# Patient Record
Sex: MALE | Race: WHITE | NOT HISPANIC OR LATINO | Employment: OTHER | URBAN - METROPOLITAN AREA
[De-identification: names, ages, dates, MRNs, and addresses within clinical notes are randomized per-mention and may not be internally consistent; named-entity substitution may affect disease eponyms.]

---

## 2019-05-15 ENCOUNTER — NURSE TRIAGE (OUTPATIENT)
Dept: ADMINISTRATIVE | Facility: CLINIC | Age: 34
End: 2019-05-15

## 2019-05-15 NOTE — TELEPHONE ENCOUNTER
Reason for Disposition   Symptoms of anxiety or panic and has not been evaluated for this by physician    Protocols used: ANXIETY AND PANIC ATTACK-A-[place]    Patient states that he is currently at work denies an plan to harm himself or others. States that he has feelings of anxiety, panic attacks and depression occasionally that he needs to see someone for. No PCP or psychiatrist on file. Would like to get establishment with a PCP. Call transferred to scheduling. Patient advised to go to [facility] if he felt that he needs to be seen today.

## 2019-05-17 ENCOUNTER — OFFICE VISIT (OUTPATIENT)
Dept: FAMILY MEDICINE | Facility: CLINIC | Age: 34
End: 2019-05-17

## 2019-05-17 VITALS
DIASTOLIC BLOOD PRESSURE: 78 MMHG | OXYGEN SATURATION: 96 % | BODY MASS INDEX: 27.78 KG/M2 | HEART RATE: 65 BPM | WEIGHT: 198.44 LBS | HEIGHT: 71 IN | TEMPERATURE: 98 F | SYSTOLIC BLOOD PRESSURE: 122 MMHG | RESPIRATION RATE: 16 BRPM

## 2019-05-17 DIAGNOSIS — F41.9 ANXIETY AND DEPRESSION: Primary | ICD-10-CM

## 2019-05-17 DIAGNOSIS — F32.A ANXIETY AND DEPRESSION: Primary | ICD-10-CM

## 2019-05-17 PROCEDURE — 99999 PR PBB SHADOW E&M-EST. PATIENT-LVL III: CPT | Mod: PBBFAC,,, | Performed by: PHYSICIAN ASSISTANT

## 2019-05-17 PROCEDURE — 99999 PR PBB SHADOW E&M-EST. PATIENT-LVL III: ICD-10-PCS | Mod: PBBFAC,,, | Performed by: PHYSICIAN ASSISTANT

## 2019-05-17 PROCEDURE — 99203 PR OFFICE/OUTPT VISIT, NEW, LEVL III, 30-44 MIN: ICD-10-PCS | Mod: S$PBB,,, | Performed by: PHYSICIAN ASSISTANT

## 2019-05-17 PROCEDURE — 99203 OFFICE O/P NEW LOW 30 MIN: CPT | Mod: S$PBB,,, | Performed by: PHYSICIAN ASSISTANT

## 2019-05-17 PROCEDURE — 99213 OFFICE O/P EST LOW 20 MIN: CPT | Mod: PBBFAC,PO | Performed by: PHYSICIAN ASSISTANT

## 2019-05-17 RX ORDER — CITALOPRAM 10 MG/1
10 TABLET ORAL DAILY
Qty: 30 TABLET | Refills: 2 | Status: SHIPPED | OUTPATIENT
Start: 2019-05-17 | End: 2019-06-14 | Stop reason: SDUPTHER

## 2019-05-17 RX ORDER — BUSPIRONE HYDROCHLORIDE 5 MG/1
5 TABLET ORAL 2 TIMES DAILY
Qty: 60 TABLET | Refills: 2 | Status: SHIPPED | OUTPATIENT
Start: 2019-05-17 | End: 2019-06-14 | Stop reason: SDUPTHER

## 2019-05-17 NOTE — PROGRESS NOTES
Subjective:       Patient ID: Sajan Lane is a 33 y.o. male.    Chief Complaint: Annual Exam (EST care ); Anxiety; Depression; and Medicare IPPE    Mr. Lane comes to clinic today to establish care. He was previously a patient of Dr. Vera but he has not been in office in over 5 years. The patient's primary concern at this time is depression and anxiety. The patient is currently going through a separation from his wife. He has custody of his four year old daughter. The patient reports that he has been increasingly tearful. He is having difficulty sleeping and has a decreased appetite. He is currently in counseling but is only attending once per month. He would like to increase the frequency if his schedule permits.     Review of Systems   Constitutional: Negative for activity change, appetite change and fever.   HENT: Negative for postnasal drip, rhinorrhea and sinus pressure.    Eyes: Negative for visual disturbance.   Respiratory: Negative for cough and shortness of breath.    Cardiovascular: Negative for chest pain.   Gastrointestinal: Negative for abdominal distention and abdominal pain.   Genitourinary: Negative for difficulty urinating and dysuria.   Musculoskeletal: Negative for arthralgias and myalgias.   Neurological: Negative for headaches.   Hematological: Negative for adenopathy.   Psychiatric/Behavioral: Positive for dysphoric mood and sleep disturbance. The patient is nervous/anxious.        Objective:      Physical Exam   Constitutional: He is oriented to person, place, and time.   HENT:   Mouth/Throat: Oropharynx is clear and moist. No oropharyngeal exudate.   Eyes: Pupils are equal, round, and reactive to light. Conjunctivae are normal.   Cardiovascular: Normal rate and regular rhythm.   Pulmonary/Chest: Effort normal and breath sounds normal. He has no wheezes.   Abdominal: Soft. Bowel sounds are normal. There is no tenderness.   Musculoskeletal: He exhibits no edema.   Lymphadenopathy:      He has no cervical adenopathy.   Neurological: He is alert and oriented to person, place, and time.   Skin: No erythema.   Psychiatric: His behavior is normal.   Patient tearful when discussing his current situation.       Assessment:       1. Anxiety and depression        Plan:   Sajan was seen today for annual exam, anxiety, depression and medicare ippe.    Diagnoses and all orders for this visit:    Anxiety and depression  -     TSH; Future  -     Comprehensive metabolic panel; Future  -     CBC auto differential; Future  -     Lipid panel; Future  -     busPIRone (BUSPAR) 5 MG Tab; Take 1 tablet (5 mg total) by mouth 2 (two) times daily.  -     citalopram (CELEXA) 10 MG tablet; Take 1 tablet (10 mg total) by mouth once daily.  -     TSH  -     Comprehensive metabolic panel  -     CBC auto differential  -     Lipid panel    Patient to increase his frequency of counseling.   Follow up in 4 weeks or sooner if needed  Patient to follow up via [facility] in 2 weeks to update me on how he is doing on medication.

## 2019-05-24 ENCOUNTER — PATIENT MESSAGE (OUTPATIENT)
Dept: FAMILY MEDICINE | Facility: CLINIC | Age: 34
End: 2019-05-24

## 2019-05-24 NOTE — TELEPHONE ENCOUNTER
Spoke to patient. He states that he has been doing well on the citalopram and buspar since his visit here on 5/17/19. States he has not missed any medication and has not changed the times that he takes the medications. Reports that he woke up this morning very anxious and more depressed than he has been since he started medication. Denies suicidal ideation. Does not feel he needs to be seen today.  Acknowledges the Memorial Day weekend activities may have his current family situation weighing more heavily on his mind.  Declines need to have medication increased at this time.  Patient states that he will e-mail again after the weekend with an update.

## 2019-06-14 ENCOUNTER — OFFICE VISIT (OUTPATIENT)
Dept: FAMILY MEDICINE | Facility: CLINIC | Age: 34
End: 2019-06-14

## 2019-06-14 VITALS
DIASTOLIC BLOOD PRESSURE: 82 MMHG | TEMPERATURE: 98 F | BODY MASS INDEX: 27.06 KG/M2 | WEIGHT: 193.31 LBS | SYSTOLIC BLOOD PRESSURE: 132 MMHG | HEART RATE: 60 BPM | HEIGHT: 71 IN | OXYGEN SATURATION: 98 % | RESPIRATION RATE: 16 BRPM

## 2019-06-14 DIAGNOSIS — F32.A ANXIETY AND DEPRESSION: ICD-10-CM

## 2019-06-14 DIAGNOSIS — F41.9 ANXIETY AND DEPRESSION: ICD-10-CM

## 2019-06-14 PROCEDURE — 99213 OFFICE O/P EST LOW 20 MIN: CPT | Mod: S$PBB,,, | Performed by: PHYSICIAN ASSISTANT

## 2019-06-14 PROCEDURE — 99999 PR PBB SHADOW E&M-EST. PATIENT-LVL IV: CPT | Mod: PBBFAC,,, | Performed by: PHYSICIAN ASSISTANT

## 2019-06-14 PROCEDURE — 99214 OFFICE O/P EST MOD 30 MIN: CPT | Mod: PBBFAC,PO | Performed by: PHYSICIAN ASSISTANT

## 2019-06-14 PROCEDURE — 99213 PR OFFICE/OUTPT VISIT, EST, LEVL III, 20-29 MIN: ICD-10-PCS | Mod: S$PBB,,, | Performed by: PHYSICIAN ASSISTANT

## 2019-06-14 PROCEDURE — 99999 PR PBB SHADOW E&M-EST. PATIENT-LVL IV: ICD-10-PCS | Mod: PBBFAC,,, | Performed by: PHYSICIAN ASSISTANT

## 2019-06-14 RX ORDER — CITALOPRAM 10 MG/1
10 TABLET ORAL DAILY
Qty: 90 TABLET | Refills: 1 | Status: SHIPPED | OUTPATIENT
Start: 2019-06-14 | End: 2019-07-11

## 2019-06-14 RX ORDER — ACETAMINOPHEN 500 MG
TABLET ORAL
COMMUNITY

## 2019-06-14 RX ORDER — BUSPIRONE HYDROCHLORIDE 5 MG/1
5 TABLET ORAL 2 TIMES DAILY
Qty: 180 TABLET | Refills: 1 | Status: SHIPPED | OUTPATIENT
Start: 2019-06-14 | End: 2019-12-31 | Stop reason: SDUPTHER

## 2019-06-14 RX ORDER — NIACIN 500 MG
500 CAPSULE, EXTENDED RELEASE ORAL NIGHTLY
COMMUNITY

## 2019-06-14 RX ORDER — ACETAMINOPHEN AND PHENYLEPHRINE HCL 325; 5 MG/1; MG/1
TABLET ORAL
COMMUNITY

## 2019-06-14 NOTE — PROGRESS NOTES
Subjective:       Patient ID: Sajan Lane is a 33 y.o. male.    Chief Complaint: Follow-up (4 wk )    Mr. Lane comes to clinic today for follow up of depression and anxiety. He has been taking celexa 10mg and buspar 5mg BID. The patient is tolerating this well and reports his symptoms are improved. He is dealing with stress much better and has been less tearful. The patient denies side effects at this time. The patient does continue to attend counseling though his counselor has been out of town recently so he has only seen her once since our last visit.     Review of Systems   Constitutional: Negative for activity change and unexpected weight change.   HENT: Negative for hearing loss, rhinorrhea and trouble swallowing.    Eyes: Negative for discharge and visual disturbance.   Respiratory: Negative for chest tightness and wheezing.    Cardiovascular: Negative for chest pain and palpitations.   Gastrointestinal: Negative for blood in stool, constipation, diarrhea and vomiting.   Endocrine: Negative for polyuria.   Genitourinary: Negative for difficulty urinating, hematuria and urgency.   Musculoskeletal: Negative for arthralgias, joint swelling and neck pain.   Neurological: Negative for weakness and headaches.   Psychiatric/Behavioral: Negative for confusion and dysphoric mood.       Objective:      Physical Exam   Constitutional: He is oriented to person, place, and time.   Cardiovascular: Normal rate and regular rhythm.   Pulmonary/Chest: Effort normal and breath sounds normal. He has no wheezes.   Abdominal: Soft. Bowel sounds are normal. There is no tenderness.   Musculoskeletal: He exhibits no edema.   Neurological: He is alert and oriented to person, place, and time.   Skin: No erythema.   Psychiatric: His behavior is normal.   Patient appears to be doing better.        Assessment:       1. Anxiety and depression        Plan:       Sajan was seen today for follow-up.    Diagnoses and all orders for  this visit:    Anxiety and depression  -     citalopram (CELEXA) 10 MG tablet; Take 1 tablet (10 mg total) by mouth once daily.  -     busPIRone (BUSPAR) 5 MG Tab; Take 1 tablet (5 mg total) by mouth 2 (two) times daily.    Improving. Continue current medication  Follow up with me in 10 weeks.

## 2019-07-08 ENCOUNTER — PATIENT MESSAGE (OUTPATIENT)
Dept: FAMILY MEDICINE | Facility: CLINIC | Age: 34
End: 2019-07-08

## 2019-07-08 NOTE — TELEPHONE ENCOUNTER
I am sorry to hear you are having more low days than good days. I do think there is room to increase the dose of medication you are on to see if we see better results with that. Is patient agreeable? I also think patient likely needs counseling on a regular basis with close follow up. Last time we spoke, he was having a hard time coordinating with his counselor. Has he been able to set up follow up with his counselor?

## 2019-07-10 ENCOUNTER — PATIENT MESSAGE (OUTPATIENT)
Dept: FAMILY MEDICINE | Facility: CLINIC | Age: 34
End: 2019-07-10

## 2019-07-10 DIAGNOSIS — F43.23 ADJUSTMENT REACTION WITH ANXIETY AND DEPRESSION: Primary | ICD-10-CM

## 2019-07-11 RX ORDER — CITALOPRAM 20 MG/1
20 TABLET, FILM COATED ORAL DAILY
Qty: 30 TABLET | Refills: 2 | Status: SHIPPED | OUTPATIENT
Start: 2019-07-11 | End: 2019-08-23 | Stop reason: SDUPTHER

## 2019-07-11 NOTE — TELEPHONE ENCOUNTER
Increased dose of citalopram sent to pharmacy. This will now be 20mg daily. Please ask patient to follow up with us via [facility] in 2 weeks to let us know how he is feeling with the increased dose in medication.

## 2019-07-25 ENCOUNTER — PATIENT MESSAGE (OUTPATIENT)
Dept: FAMILY MEDICINE | Facility: CLINIC | Age: 34
End: 2019-07-25

## 2019-07-25 NOTE — TELEPHONE ENCOUNTER
This might be beneficial for the patient. If patient is interested in adderall, we can refer patient to our clinical psychiatrist or center for ADD here in [place]. Please advise on where patient would like me to send the referral. Thanks!

## 2019-07-29 ENCOUNTER — TELEPHONE (OUTPATIENT)
Dept: FAMILY MEDICINE | Facility: CLINIC | Age: 34
End: 2019-07-29

## 2019-07-29 ENCOUNTER — PATIENT MESSAGE (OUTPATIENT)
Dept: FAMILY MEDICINE | Facility: CLINIC | Age: 34
End: 2019-07-29

## 2019-07-29 DIAGNOSIS — F43.23 ADJUSTMENT DISORDER WITH MIXED ANXIETY AND DEPRESSED MOOD: ICD-10-CM

## 2019-07-29 DIAGNOSIS — R41.840 ATTENTION AND CONCENTRATION DEFICIT: Primary | ICD-10-CM

## 2019-07-29 NOTE — TELEPHONE ENCOUNTER
----- Message from Arlen Dean sent at 7/29/2019  9:54 AM CDT -----  Contact: Sajan jerome  Type: Needs Medical Advice    Who Called:  Sajan Pool Call Back Number: [phone]   Additional Information: Pls call pt regarding his med increase. Tried skvicentae/basim n/a

## 2019-08-23 ENCOUNTER — OFFICE VISIT (OUTPATIENT)
Dept: FAMILY MEDICINE | Facility: CLINIC | Age: 34
End: 2019-08-23

## 2019-08-23 VITALS
SYSTOLIC BLOOD PRESSURE: 132 MMHG | DIASTOLIC BLOOD PRESSURE: 82 MMHG | HEART RATE: 66 BPM | TEMPERATURE: 98 F | WEIGHT: 199.75 LBS | OXYGEN SATURATION: 97 % | HEIGHT: 71 IN | BODY MASS INDEX: 27.97 KG/M2 | RESPIRATION RATE: 19 BRPM

## 2019-08-23 DIAGNOSIS — F43.23 ADJUSTMENT REACTION WITH ANXIETY AND DEPRESSION: ICD-10-CM

## 2019-08-23 DIAGNOSIS — R41.840 CONCENTRATION DEFICIT: Primary | ICD-10-CM

## 2019-08-23 DIAGNOSIS — F41.9 ANXIETY AND DEPRESSION: ICD-10-CM

## 2019-08-23 DIAGNOSIS — F32.A ANXIETY AND DEPRESSION: ICD-10-CM

## 2019-08-23 PROCEDURE — 99999 PR PBB SHADOW E&M-EST. PATIENT-LVL IV: CPT | Mod: PBBFAC,,, | Performed by: PHYSICIAN ASSISTANT

## 2019-08-23 PROCEDURE — 99999 PR PBB SHADOW E&M-EST. PATIENT-LVL IV: ICD-10-PCS | Mod: PBBFAC,,, | Performed by: PHYSICIAN ASSISTANT

## 2019-08-23 PROCEDURE — 99214 OFFICE O/P EST MOD 30 MIN: CPT | Mod: PBBFAC,PO | Performed by: PHYSICIAN ASSISTANT

## 2019-08-23 PROCEDURE — 99213 PR OFFICE/OUTPT VISIT, EST, LEVL III, 20-29 MIN: ICD-10-PCS | Mod: S$PBB,,, | Performed by: PHYSICIAN ASSISTANT

## 2019-08-23 PROCEDURE — 99213 OFFICE O/P EST LOW 20 MIN: CPT | Mod: S$PBB,,, | Performed by: PHYSICIAN ASSISTANT

## 2019-08-23 RX ORDER — CITALOPRAM 20 MG/1
20 TABLET, FILM COATED ORAL DAILY
Qty: 90 TABLET | Refills: 1 | Status: SHIPPED | OUTPATIENT
Start: 2019-08-23 | End: 2020-07-10 | Stop reason: SDUPTHER

## 2019-08-23 NOTE — PROGRESS NOTES
Subjective:       Patient ID: Sajan Lane is a 33 y.o. male.    Chief Complaint: Follow-up (3 month )    Ms. Lane comes to clinic today for 3 month follow up. He reports he is doing well since increasing the dose of the citalopram to 20mg. His depression and anxiety is controlled. The patient continues to follow up with his therapist. The patient does complain of difficulty concentrating. He reports that he will sit at his desk and he is unable to focus. 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 The patient would like to be evaluated for ADD at this time.     Review of Systems   Constitutional: Negative for activity change, appetite change and fever.   HENT: Negative for postnasal drip, rhinorrhea and sinus pressure.    Eyes: Negative for visual disturbance.   Respiratory: Negative for cough and shortness of breath.    Cardiovascular: Negative for chest pain.   Gastrointestinal: Negative for abdominal distention and abdominal pain.   Genitourinary: Negative for difficulty urinating and dysuria.   Musculoskeletal: Negative for arthralgias and myalgias.   Neurological: Negative for headaches.   Hematological: Negative for adenopathy.   Psychiatric/Behavioral: Positive for decreased concentration. The patient is not nervous/anxious.        Objective:      Physical Exam   Constitutional: He is oriented to person, place, and time.   Cardiovascular: Normal rate and regular rhythm.   Pulmonary/Chest: Effort normal and breath sounds normal. He has no wheezes.   Abdominal: Soft. Bowel sounds are normal. There is no tenderness.   Musculoskeletal: He exhibits no edema.   Neurological: He is alert and oriented to person, place, and time.   Skin: No erythema.   Psychiatric: His behavior is normal.       Assessment:       1. Concentration deficit    2. Anxiety and depression    3. Adjustment reaction with anxiety and depression        Plan:       Sajan was seen today for  follow-up.    Diagnoses and all orders for this visit:    Concentration deficit  -     Ambulatory referral to Psychiatry    Anxiety and depression  -     citalopram (CELEXA) 20 MG tablet; Take 1 tablet (20 mg total) by mouth once daily.    Adjustment reaction with anxiety and depression  -     citalopram (CELEXA) 20 MG tablet; Take 1 tablet (20 mg total) by mouth once daily.    Continue current medication  Referred to psychiatrist for evaluation for ADD.

## 2019-09-11 ENCOUNTER — OFFICE VISIT (OUTPATIENT)
Dept: PSYCHIATRY | Facility: CLINIC | Age: 34
End: 2019-09-11

## 2019-09-11 VITALS
HEIGHT: 71 IN | RESPIRATION RATE: 18 BRPM | SYSTOLIC BLOOD PRESSURE: 133 MMHG | HEART RATE: 60 BPM | BODY MASS INDEX: 27.66 KG/M2 | WEIGHT: 197.56 LBS | DIASTOLIC BLOOD PRESSURE: 83 MMHG

## 2019-09-11 DIAGNOSIS — F32.A ANXIETY AND DEPRESSION: Primary | ICD-10-CM

## 2019-09-11 DIAGNOSIS — F41.9 ANXIETY AND DEPRESSION: Primary | ICD-10-CM

## 2019-09-11 PROCEDURE — 99999 PR PBB SHADOW E&M-EST. PATIENT-LVL II: ICD-10-PCS | Mod: PBBFAC,,, | Performed by: PSYCHOLOGIST

## 2019-09-11 PROCEDURE — 90791 PR PSYCHIATRIC DIAGNOSTIC EVALUATION: ICD-10-PCS | Mod: ,,, | Performed by: PSYCHOLOGIST

## 2019-09-11 PROCEDURE — 99212 OFFICE O/P EST SF 10 MIN: CPT | Mod: PBBFAC,PO | Performed by: PSYCHOLOGIST

## 2019-09-11 PROCEDURE — 90791 PSYCH DIAGNOSTIC EVALUATION: CPT | Mod: ,,, | Performed by: PSYCHOLOGIST

## 2019-09-11 PROCEDURE — 99999 PR PBB SHADOW E&M-EST. PATIENT-LVL II: CPT | Mod: PBBFAC,,, | Performed by: PSYCHOLOGIST

## 2019-09-11 RX ORDER — CITALOPRAM 10 MG/1
10 TABLET ORAL DAILY
Refills: 1 | COMMUNITY
Start: 2019-08-11 | End: 2019-09-12 | Stop reason: ALTCHOICE

## 2019-09-11 NOTE — PROGRESS NOTES
Client:  Sajan Lane  :  1985  [place]ter, [place]  [id number]    Presenting complaint:/Past psychiatric treatment:  Sajan is a 32yo malke who presented with a history of anxiety and depression that started after a relationship breakup earlier this year.  He also sts he has trouble focusing and that he had been prescribed Adderall when he was in college.  By his report his mood and anxiety are much better since he has been on Celexa and Buspar. He is also taking Melatonin 5mg to help with sleep.  Sajan was frustrated that nop medication would be prescribed today and that he would need to come back to the clinic for testing, and that fasting labs needed to be done before any medicaiton would be prescribed.  Attenpted to talk about his lack of motivation with work and his ability to function well at home did not support ADHD.  Initiated discussion about the risk of stim agents and that they do not provide 24 hours coverage and that as other health problems arise they may not be able to be continued.  He was short in his responses and angry that he would need to return to the clinic.  Today his PHQ9 score was:  6 and his GAD7 score was 7.  Sajan sts he has hd no psychiatric admission and he had no history of SI/HI/delusions/halluciantions/mood swings or expansive mood periods.  He described his subjective complaint of concentration problems as having trouble paying attention to detail sometimes but also having a lack of motivation.  He would prefer to be at home with his 5yo daughter.  He has no trouble completing tasks at home and sts his mother has complemented him how how clean the house is and how well he is doing as a single dad.  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, landlord and in palm trees), single parent    Relevant lab reviewed none since 2013-has orders needs to get done  Relevant EKG reviewed none on file no cardiac symptoms    Review of patient's allergies  indicates:  No Known Allergies    Current Outpatient Medications:     busPIRone (BUSPAR) 5 MG Tab, Take 1 tablet (5 mg total) by mouth 2 (two) times daily., Disp: 180 tablet, Rfl: 1    citalopram (CELEXA) 20 MG tablet, Take 1 tablet (20 mg total) by mouth once daily., Disp: 90 tablet, Rfl: 1    cyanocobalamin-cobamamide 5,000-100 mcg Subl, Place under the tongue., Disp: , Rfl:     melatonin 5 mg Tab, Take by mouth., Disp: , Rfl:     niacin 500 MG CpSR, Take 500 mg by mouth every evening., Disp: , Rfl:   Past Medical History:   Diagnosis Date    Dyslexia        Clinical presentation:  Appearance:  The client presented in casual attire evidencing good grooming and hygiene    Neuro:  the client was alert, oriented x 4 and evidenced good judgement and insight.      Attention/concentration:  Was good in session.  He has c/o poor motivation and procrastinating complaint at work, sts trouble paying attn to detail but does not have any problems at home.  He has no dx of ADHD during his youth but was diagnosed with dyslexia. He was not placed on any stim agent until college.      Memory:  The client had no subjective memory complaints and he was able to recall information discussed in session accurately    Judgement:  behavior is adequate to the situation    Fund of knowledge:  intact and appropriate to age and level of education    Gait/station:  was normal    Heart: the patients heartbeat was regular in rate     Lung: normal effort    Skin/Extremities:  warm and dry, no rashes/lesions/bruises or edema noted.    Mood:  was frustrated at delay in getting medication.   No SI/HI/delusions/hallucinations/mood swings or expansive mood periods reported or suspected.     Affect: was normal range    Speech:  normal rate and tone     Sleep: Sajan states he lays down at 830-9PM falls asleep easily and gets up at 4-5am. He typically works out 4-5 mornings/week before work.  He feels he is getting sufficient rest and sts he  feels well rested when he wakes up.     Appetite: was reported as good, denied skipping meals.    Pain complaints:  none were voiced    ETOH/Substance use history:  The client had no reported ETOH/or other substance use problems    Psychosocial history:  The client currently lives with his 5yo daughter. The parents share custody.    Education/Work:  Sajan has a history of dyslexia and was never assessed for ADHD or learning problems to his knowledge.  He sts he was not on any stim agents until college but they  Helped him get his work done.  His ACT score was said to have been 18 and his LAST score 128. Both of these scores are in the low range.  150 is average on the LAST.  He did complete college and is an .  He works part time as an attn, is a landlord and sells palm tress.  He sts he used to work 60+ hours a week but now works about 30 hours a week.    Education/session discussion:  · Initiated discussion about medication for ADHD and the need for testing and labs prior to any medication being recommended    Impression: Sajan has mild ease of frustration and is very rigid in his interactions.  He is seeking stim agents and angry he has to return for testing and labs    Plan:  Labs and CPT2 scheduled for next week    Session:  [id number]

## 2019-09-12 ENCOUNTER — OFFICE VISIT (OUTPATIENT)
Dept: FAMILY MEDICINE | Facility: CLINIC | Age: 34
End: 2019-09-12

## 2019-09-12 ENCOUNTER — PATIENT MESSAGE (OUTPATIENT)
Dept: PSYCHIATRY | Facility: CLINIC | Age: 34
End: 2019-09-12

## 2019-09-12 ENCOUNTER — HOSPITAL ENCOUNTER (OUTPATIENT)
Dept: RADIOLOGY | Facility: CLINIC | Age: 34
Discharge: HOME | End: 2019-09-12
Attending: PHYSICIAN ASSISTANT

## 2019-09-12 ENCOUNTER — PATIENT MESSAGE (OUTPATIENT)
Dept: FAMILY MEDICINE | Facility: CLINIC | Age: 34
End: 2019-09-12

## 2019-09-12 ENCOUNTER — LAB VISIT (OUTPATIENT)
Dept: LAB | Facility: HOSPITAL | Age: 34
End: 2019-09-12
Attending: PHYSICIAN ASSISTANT

## 2019-09-12 VITALS
DIASTOLIC BLOOD PRESSURE: 80 MMHG | WEIGHT: 199.75 LBS | RESPIRATION RATE: 17 BRPM | SYSTOLIC BLOOD PRESSURE: 126 MMHG | HEIGHT: 71 IN | OXYGEN SATURATION: 98 % | BODY MASS INDEX: 27.97 KG/M2 | TEMPERATURE: 99 F | HEART RATE: 76 BPM

## 2019-09-12 DIAGNOSIS — Z11.3 SCREENING FOR STD (SEXUALLY TRANSMITTED DISEASE): ICD-10-CM

## 2019-09-12 DIAGNOSIS — N50.89 TESTICULAR SWELLING: ICD-10-CM

## 2019-09-12 DIAGNOSIS — N45.1 ACUTE EPIDIDYMITIS: Primary | ICD-10-CM

## 2019-09-12 LAB
BILIRUB SERPL-MCNC: ABNORMAL MG/DL
BLOOD URINE, POC: ABNORMAL
COLOR, POC UA: YELLOW
GLUCOSE UR QL STRIP: NORMAL
HIV 1+2 AB+HIV1 P24 AG SERPL QL IA: NEGATIVE
KETONES UR QL STRIP: ABNORMAL
LEUKOCYTE ESTERASE URINE, POC: ABNORMAL
NITRITE, POC UA: ABNORMAL
PH, POC UA: 6
PROTEIN, POC: ABNORMAL
SPECIFIC GRAVITY, POC UA: 1.01
UROBILINOGEN, POC UA: NORMAL

## 2019-09-12 PROCEDURE — 87491 CHLMYD TRACH DNA AMP PROBE: CPT

## 2019-09-12 PROCEDURE — 86703 HIV-1/HIV-2 1 RESULT ANTBDY: CPT

## 2019-09-12 PROCEDURE — 86592 SYPHILIS TEST NON-TREP QUAL: CPT

## 2019-09-12 PROCEDURE — 99214 PR OFFICE/OUTPT VISIT, EST, LEVL IV, 30-39 MIN: ICD-10-PCS | Mod: S$PBB,,, | Performed by: PHYSICIAN ASSISTANT

## 2019-09-12 PROCEDURE — 99999 PR PBB SHADOW E&M-EST. PATIENT-LVL IV: ICD-10-PCS | Mod: PBBFAC,,, | Performed by: PHYSICIAN ASSISTANT

## 2019-09-12 PROCEDURE — 36415 COLL VENOUS BLD VENIPUNCTURE: CPT | Mod: PO

## 2019-09-12 PROCEDURE — 99999 PR PBB SHADOW E&M-EST. PATIENT-LVL IV: CPT | Mod: PBBFAC,,, | Performed by: PHYSICIAN ASSISTANT

## 2019-09-12 PROCEDURE — 99214 OFFICE O/P EST MOD 30 MIN: CPT | Mod: S$PBB,,, | Performed by: PHYSICIAN ASSISTANT

## 2019-09-12 PROCEDURE — 76870 US SCROTUM AND TESTICLES: ICD-10-PCS | Mod: 26,,, | Performed by: RADIOLOGY

## 2019-09-12 PROCEDURE — 96372 THER/PROPH/DIAG INJ SC/IM: CPT | Mod: PBBFAC,PO

## 2019-09-12 PROCEDURE — 99214 OFFICE O/P EST MOD 30 MIN: CPT | Mod: PBBFAC,PO | Performed by: PHYSICIAN ASSISTANT

## 2019-09-12 PROCEDURE — 76870 US EXAM SCROTUM: CPT | Mod: 26,,, | Performed by: RADIOLOGY

## 2019-09-12 PROCEDURE — 81002 URINALYSIS NONAUTO W/O SCOPE: CPT | Mod: PBBFAC,PO | Performed by: PHYSICIAN ASSISTANT

## 2019-09-12 PROCEDURE — 76870 US EXAM SCROTUM: CPT | Mod: TC,PO

## 2019-09-12 RX ORDER — CEFTRIAXONE 250 MG/1
250 INJECTION, POWDER, FOR SOLUTION INTRAMUSCULAR; INTRAVENOUS
Status: COMPLETED | OUTPATIENT
Start: 2019-09-12 | End: 2019-09-12

## 2019-09-12 RX ORDER — DOXYCYCLINE HYCLATE 100 MG
100 TABLET ORAL 2 TIMES DAILY
Qty: 20 TABLET | Refills: 0 | Status: SHIPPED | OUTPATIENT
Start: 2019-09-12 | End: 2019-09-22

## 2019-09-12 RX ADMIN — CEFTRIAXONE SODIUM 250 MG: 250 INJECTION, POWDER, FOR SOLUTION INTRAMUSCULAR; INTRAVENOUS at 10:09

## 2019-09-12 NOTE — PROGRESS NOTES
2 identifiers, name and , used to confirm patient identity.  Procedure was explained and patient verbalized understanding. Ceftriaxone 250 mg was given IM in the right upper outer quadrant of the gluteus using sterile technique. Patient tolerated procedure well. No residual bleeding noted at the injection site.

## 2019-09-13 ENCOUNTER — PATIENT MESSAGE (OUTPATIENT)
Dept: FAMILY MEDICINE | Facility: CLINIC | Age: 34
End: 2019-09-13

## 2019-09-13 LAB — RPR SER QL: NORMAL

## 2019-09-14 LAB
C TRACH DNA SPEC QL NAA+PROBE: NOT DETECTED
N GONORRHOEA DNA SPEC QL NAA+PROBE: NOT DETECTED

## 2019-09-16 ENCOUNTER — PATIENT MESSAGE (OUTPATIENT)
Dept: FAMILY MEDICINE | Facility: CLINIC | Age: 34
End: 2019-09-16

## 2019-09-17 NOTE — TELEPHONE ENCOUNTER
Patient has responded to question in result note regarding current symptoms. Please see patient's e-mail.

## 2019-09-18 NOTE — TELEPHONE ENCOUNTER
Please let patient know the Ms Salter is out.  His Chlamydia/ gonorrhea test was negative.    She recommended that he follow up with urology for further evaluation   Please schedule

## 2019-09-19 ENCOUNTER — LAB VISIT (OUTPATIENT)
Dept: LAB | Facility: HOSPITAL | Age: 34
End: 2019-09-19
Attending: PSYCHOLOGIST

## 2019-09-19 ENCOUNTER — OFFICE VISIT (OUTPATIENT)
Dept: PSYCHIATRY | Facility: CLINIC | Age: 34
End: 2019-09-19

## 2019-09-19 VITALS
DIASTOLIC BLOOD PRESSURE: 71 MMHG | BODY MASS INDEX: 28.02 KG/M2 | WEIGHT: 200.19 LBS | HEIGHT: 71 IN | RESPIRATION RATE: 18 BRPM | SYSTOLIC BLOOD PRESSURE: 118 MMHG | HEART RATE: 51 BPM

## 2019-09-19 DIAGNOSIS — F32.A ANXIETY AND DEPRESSION: ICD-10-CM

## 2019-09-19 DIAGNOSIS — F90.0 ADHD (ATTENTION DEFICIT HYPERACTIVITY DISORDER), INATTENTIVE TYPE: Primary | ICD-10-CM

## 2019-09-19 DIAGNOSIS — F41.9 ANXIETY AND DEPRESSION: ICD-10-CM

## 2019-09-19 PROCEDURE — 99212 OFFICE O/P EST SF 10 MIN: CPT | Mod: PBBFAC,PO | Performed by: PSYCHOLOGIST

## 2019-09-19 PROCEDURE — 90792 PSYCH DIAG EVAL W/MED SRVCS: CPT | Mod: ,,, | Performed by: PSYCHOLOGIST

## 2019-09-19 PROCEDURE — 36415 COLL VENOUS BLD VENIPUNCTURE: CPT | Mod: PO

## 2019-09-19 PROCEDURE — 99999 PR PBB SHADOW E&M-EST. PATIENT-LVL II: ICD-10-PCS | Mod: PBBFAC,,, | Performed by: PSYCHOLOGIST

## 2019-09-19 PROCEDURE — 90792 PR PSYCHIATRIC DIAGNOSTIC EVALUATION W/MEDICAL SERVICES: ICD-10-PCS | Mod: ,,, | Performed by: PSYCHOLOGIST

## 2019-09-19 PROCEDURE — 99999 PR PBB SHADOW E&M-EST. PATIENT-LVL II: CPT | Mod: PBBFAC,,, | Performed by: PSYCHOLOGIST

## 2019-09-19 PROCEDURE — 80307 DRUG TEST PRSMV CHEM ANLYZR: CPT

## 2019-09-23 LAB
AMPHETAMINES SERPL QL: NEGATIVE
BARBITURATES SERPL QL SCN: NEGATIVE
BENZODIAZ SERPL QL SCN: NEGATIVE
BZE SERPL QL: NEGATIVE
CARBOXYTHC SERPL QL SCN: NEGATIVE
ETHANOL SERPL QL SCN: NEGATIVE
METHADONE SERPL QL SCN: NEGATIVE
OPIATES SERPL QL SCN: NEGATIVE
PCP SERPL QL SCN: NEGATIVE
PROPOXYPH SERPL QL: NEGATIVE

## 2019-09-24 NOTE — PROGRESS NOTES
Client:  Sajan Lane  :  1985  [place]ter, [place]  [id number]    Presenting complaint:/Past psychiatric treatment:  Sajan is a 34yo malke who presented with a history of anxiety and depression that started after a relationship breakup earlier this year.  He also sts he has trouble focusing and that he had been prescribed Adderall when he was in college.  By his report his mood and anxiety are much better since he has been on Celexa and Buspar. He is also taking Melatonin 5mg to help with sleep.  Sajan was frustrated that no medication would be prescribed today and that he would need to come back to the clinic for testing, and that fasting labs needed to be done before any medicaiton would be prescribed.  Attempted to talk about his lack of motivation with work and his ability to function well at home. Attempted to talk about the need for routine, structure and strategies to help improve functionality such as use of a calendar or planner, positive sleep hygieneetc. Discussed the risk of stim agents and that they do not provide 24 hours coverage and that as other health problems arise they may not be able to be continued.  He was short in his responses and angry that he would need to return to the clinic for further assessment.  .      Today his PHQ9 score was 6 and his GAD7 score was 7.  Sajan sts he has had no psychiatric admission and he had no history of SI/HI/delusions/halluciantions/mood swings or expansive mood periods.  He described his subjective complaint of concentration problems as having trouble paying attention to detail sometimes but also having a lack of motivation.  He would prefer to be at home with his 5yo daughter.  He has no trouble completing tasks at home and sts his mother has complemented him how how clean the house is and how well he is doing as a single dad.  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, landlord and in palm trees), single parent    Relevant lab  reviewed none since 2013-has orders needs to get done  Relevant EKG reviewed none on file no cardiac symptoms    Review of patient's allergies indicates:  No Known Allergies    Current Outpatient Medications:     busPIRone (BUSPAR) 5 MG Tab, Take 1 tablet (5 mg total) by mouth 2 (two) times daily., Disp: 180 tablet, Rfl: 1    citalopram (CELEXA) 20 MG tablet, Take 1 tablet (20 mg total) by mouth once daily., Disp: 90 tablet, Rfl: 1    cyanocobalamin-cobamamide 5,000-100 mcg Subl, Place under the tongue., Disp: , Rfl:     melatonin 5 mg Tab, Take by mouth., Disp: , Rfl:     niacin 500 MG CpSR, Take 500 mg by mouth every evening., Disp: , Rfl:   Past Medical History:   Diagnosis Date    Dyslexia        Clinical presentation:  Appearance:  The client presented in casual attire evidencing good grooming and hygiene    Neuro:  the client was alert, oriented x 4 and evidenced good judgement and insight.      Attention/concentration:  Was good in session.  He has c/o poor motivation and procrastinating complaint at work, sts trouble paying attn to detail but does not have any problems at home.  He has no dx of ADHD during his youth but was diagnosed with dyslexia. He was not placed on any stim agent until college.      Memory:  The client had no subjective memory complaints and he was able to recall information discussed in session accurately    Judgement:  behavior is adequate to the situation    Fund of knowledge:  intact and appropriate to age and level of education    Gait/station:  was normal    Heart: the patients heartbeat was regular in rate     Lung: normal effort    Skin/Extremities:  warm and dry, no rashes/lesions/bruises or edema noted.    Mood:  was frustrated at delay in getting medication. No subjective mood or anxiety complaints.  No SI/HI/delusions/hallucinations/mood swings or expansive mood periods reported.     Affect: was normal range    Speech:  normal rate and tone     Sleep: Sajan states he  lays down at 830-9PM falls asleep easily and gets up at 4-5am. He typically works out 4-5 mornings/week before work.  He feels he is getting sufficient rest and sts he feels well rested when he wakes up.     Appetite: was reported as good, denied skipping meals.    Pain complaints:  none were voiced    ETOH/Substance use history:  The client had no reported ETOH/or other substance use problems    Psychosocial history:  The client currently lives with his 3yo daughter. The parents share custody.    Education/Work:  Sajan has a history of dyslexia and was never assessed for ADHD or learning problems to his knowledge.  He sts he was not on any stim agents until college but they helped him get his work done.  His ACT score was said to have been 18 and his LSAT score 128. Both of these scores are in the low range.  (150 is average on the LAST).  He did complete college and is an . He works part time as an attn, is a landlord and sells palm tress.  He sts he used to work 60+ hours a week but now works about 30 hours a week because he is a single dad.      Education/session discussion:  · Initiated discussion about medication for ADHD and the need for testing and labs prior to any medication being recommended. Discussed RBA/meded. Pt was not very receptive to information shared.    Impression: Sajan has mild ease of frustration and is very rigid in his interactions.  He is seeking stim agents and angry he has to return for testing and labs    Plan:  Labs and CPT2 scheduled for next week    Session:  [id number]

## 2019-09-25 RX ORDER — METHYLPHENIDATE HYDROCHLORIDE 18 MG/1
TABLET ORAL
Qty: 30 TABLET | Refills: 0 | Status: SHIPPED | OUTPATIENT
Start: 2019-09-25 | End: 2019-11-05 | Stop reason: SDUPTHER

## 2019-11-05 NOTE — TELEPHONE ENCOUNTER
Pt is requesting medication refill on Concerta 18 mg   Last refill: 9/25/19  Last visit: 9/19/19  Follow Up: none scheduled

## 2019-11-07 ENCOUNTER — PATIENT MESSAGE (OUTPATIENT)
Dept: PSYCHIATRY | Facility: CLINIC | Age: 34
End: 2019-11-07

## 2019-11-07 RX ORDER — METHYLPHENIDATE HYDROCHLORIDE 18 MG/1
TABLET ORAL
Qty: 30 TABLET | Refills: 0 | Status: CANCELLED | OUTPATIENT
Start: 2019-11-07

## 2019-11-07 RX ORDER — METHYLPHENIDATE HYDROCHLORIDE 18 MG/1
TABLET ORAL
Qty: 30 TABLET | Refills: 0 | Status: SHIPPED | OUTPATIENT
Start: 2019-11-07 | End: 2019-12-16 | Stop reason: SDUPTHER

## 2019-12-16 NOTE — TELEPHONE ENCOUNTER
Pt is requesting medication refill on methylphenidate HCl (CONCERTA) 18 MG CR tablet  Last refill: 11/7/2019  Last visit: 9/19/2019  Follow Up: 1/3/2020

## 2019-12-18 RX ORDER — METHYLPHENIDATE HYDROCHLORIDE 18 MG/1
TABLET ORAL
Qty: 30 TABLET | Refills: 0 | Status: SHIPPED | OUTPATIENT
Start: 2019-12-18 | End: 2020-03-30 | Stop reason: SDUPTHER

## 2019-12-27 ENCOUNTER — TELEPHONE (OUTPATIENT)
Dept: PSYCHIATRY | Facility: CLINIC | Age: 34
End: 2019-12-27

## 2019-12-27 NOTE — TELEPHONE ENCOUNTER
Spoke with patient to make aware that Dr. Hernández wouldn't be in on 1/3/20 & we would need to R/S appt--patient wished to just cancel at this time & stated he would call back next week to R/S

## 2019-12-31 DIAGNOSIS — F32.A ANXIETY AND DEPRESSION: ICD-10-CM

## 2019-12-31 DIAGNOSIS — F41.9 ANXIETY AND DEPRESSION: ICD-10-CM

## 2019-12-31 NOTE — TELEPHONE ENCOUNTER
On call refill  Last ov 9/12/19  Last refill 6/14/19  Next apu 2/14/20 with Joie  Does not have an appointment to establish care with a pcp

## 2020-01-02 RX ORDER — BUSPIRONE HYDROCHLORIDE 5 MG/1
5 TABLET ORAL 2 TIMES DAILY
Qty: 180 TABLET | Refills: 1 | Status: SHIPPED | OUTPATIENT
Start: 2020-01-02 | End: 2020-04-16 | Stop reason: SDUPTHER

## 2020-03-17 ENCOUNTER — PATIENT MESSAGE (OUTPATIENT)
Dept: FAMILY MEDICINE | Facility: CLINIC | Age: 35
End: 2020-03-17

## 2020-03-17 NOTE — TELEPHONE ENCOUNTER
Has patient been running fever? There is nothing to prescribe at this time as we are treating with supportive therapy. We are only testing patient at this time who are immunocompromised.    If patient is not feeling well, we recommend that patient stay home and self quarantine if necessary.

## 2020-03-30 NOTE — TELEPHONE ENCOUNTER
Pt is requesting medication refill on Concerta 18 mg   Last refill: 12/18/19  Last visit: 9/19/19  Follow Up: none scheduled

## 2020-04-08 RX ORDER — METHYLPHENIDATE HYDROCHLORIDE 18 MG/1
TABLET ORAL
Qty: 30 TABLET | Refills: 0 | Status: SHIPPED | OUTPATIENT
Start: 2020-04-08 | End: 2020-09-21

## 2020-04-16 DIAGNOSIS — F41.9 ANXIETY AND DEPRESSION: ICD-10-CM

## 2020-04-16 DIAGNOSIS — F32.A ANXIETY AND DEPRESSION: ICD-10-CM

## 2020-04-17 RX ORDER — BUSPIRONE HYDROCHLORIDE 5 MG/1
5 TABLET ORAL 2 TIMES DAILY
Qty: 180 TABLET | Refills: 1 | Status: SHIPPED | OUTPATIENT
Start: 2020-04-17 | End: 2020-07-10 | Stop reason: SDUPTHER

## 2020-06-09 ENCOUNTER — PATIENT MESSAGE (OUTPATIENT)
Dept: PSYCHIATRY | Facility: CLINIC | Age: 35
End: 2020-06-09

## 2020-06-09 RX ORDER — METHYLPHENIDATE HYDROCHLORIDE 18 MG/1
TABLET ORAL
Qty: 30 TABLET | Refills: 0 | Status: SHIPPED | OUTPATIENT
Start: 2020-06-09 | End: 2020-09-21

## 2020-07-10 DIAGNOSIS — F41.9 ANXIETY AND DEPRESSION: ICD-10-CM

## 2020-07-10 DIAGNOSIS — F32.A ANXIETY AND DEPRESSION: ICD-10-CM

## 2020-07-10 DIAGNOSIS — F43.23 ADJUSTMENT REACTION WITH ANXIETY AND DEPRESSION: ICD-10-CM

## 2020-07-10 NOTE — TELEPHONE ENCOUNTER
Prescription refill request.   LOV: 09/12/2019  NOV: 09/21/2020  LDR: 04/17/2020  Last Labs: 05/17/2019

## 2020-07-10 NOTE — TELEPHONE ENCOUNTER
Prescription refill request.   LOV: 09/12/2019  NOV: 09/21/2020  LDR: 08/23/2019  Last Labs: 05/17/2019

## 2020-07-11 RX ORDER — BUSPIRONE HYDROCHLORIDE 5 MG/1
5 TABLET ORAL 2 TIMES DAILY
Qty: 180 TABLET | Refills: 1 | Status: SHIPPED | OUTPATIENT
Start: 2020-07-11 | End: 2021-03-15 | Stop reason: SDUPTHER

## 2020-07-11 RX ORDER — CITALOPRAM 20 MG/1
20 TABLET, FILM COATED ORAL DAILY
Qty: 90 TABLET | Refills: 1 | Status: SHIPPED | OUTPATIENT
Start: 2020-07-11 | End: 2021-01-21 | Stop reason: SDUPTHER

## 2020-09-21 ENCOUNTER — LAB VISIT (OUTPATIENT)
Dept: LAB | Facility: HOSPITAL | Age: 35
End: 2020-09-21
Attending: FAMILY MEDICINE

## 2020-09-21 ENCOUNTER — OFFICE VISIT (OUTPATIENT)
Dept: FAMILY MEDICINE | Facility: CLINIC | Age: 35
End: 2020-09-21

## 2020-09-21 VITALS
TEMPERATURE: 98 F | HEIGHT: 71 IN | SYSTOLIC BLOOD PRESSURE: 110 MMHG | DIASTOLIC BLOOD PRESSURE: 80 MMHG | OXYGEN SATURATION: 96 % | WEIGHT: 211.63 LBS | BODY MASS INDEX: 29.63 KG/M2 | HEART RATE: 55 BPM

## 2020-09-21 DIAGNOSIS — F90.0 ATTENTION DEFICIT HYPERACTIVITY DISORDER (ADHD), PREDOMINANTLY INATTENTIVE TYPE: ICD-10-CM

## 2020-09-21 DIAGNOSIS — F90.0 ATTENTION DEFICIT HYPERACTIVITY DISORDER (ADHD), PREDOMINANTLY INATTENTIVE TYPE: Primary | ICD-10-CM

## 2020-09-21 LAB
ALBUMIN SERPL BCP-MCNC: 4.1 G/DL (ref 3.5–5.2)
ALP SERPL-CCNC: 65 U/L (ref 55–135)
ALT SERPL W/O P-5'-P-CCNC: 30 U/L (ref 10–44)
ANION GAP SERPL CALC-SCNC: 9 MMOL/L (ref 8–16)
AST SERPL-CCNC: 26 U/L (ref 10–40)
BASOPHILS # BLD AUTO: 0.07 K/UL (ref 0–0.2)
BASOPHILS NFR BLD: 0.9 % (ref 0–1.9)
BILIRUB SERPL-MCNC: 0.8 MG/DL (ref 0.1–1)
BUN SERPL-MCNC: 24 MG/DL (ref 6–20)
CALCIUM SERPL-MCNC: 8.8 MG/DL (ref 8.7–10.5)
CHLORIDE SERPL-SCNC: 106 MMOL/L (ref 95–110)
CHOLEST SERPL-MCNC: 131 MG/DL (ref 120–199)
CHOLEST/HDLC SERPL: 3.2 {RATIO} (ref 2–5)
CO2 SERPL-SCNC: 28 MMOL/L (ref 23–29)
CREAT SERPL-MCNC: 0.9 MG/DL (ref 0.5–1.4)
DIFFERENTIAL METHOD: ABNORMAL
EOSINOPHIL # BLD AUTO: 1 K/UL (ref 0–0.5)
EOSINOPHIL NFR BLD: 12.9 % (ref 0–8)
ERYTHROCYTE [DISTWIDTH] IN BLOOD BY AUTOMATED COUNT: 11.9 % (ref 11.5–14.5)
EST. GFR  (AFRICAN AMERICAN): >60 ML/MIN/1.73 M^2
EST. GFR  (NON AFRICAN AMERICAN): >60 ML/MIN/1.73 M^2
GLUCOSE SERPL-MCNC: 89 MG/DL (ref 70–110)
HCT VFR BLD AUTO: 40.3 % (ref 40–54)
HDLC SERPL-MCNC: 41 MG/DL (ref 40–75)
HDLC SERPL: 31.3 % (ref 20–50)
HGB BLD-MCNC: 13.5 G/DL (ref 14–18)
IMM GRANULOCYTES # BLD AUTO: 0.01 K/UL (ref 0–0.04)
IMM GRANULOCYTES NFR BLD AUTO: 0.1 % (ref 0–0.5)
LDLC SERPL CALC-MCNC: 81.4 MG/DL (ref 63–159)
LYMPHOCYTES # BLD AUTO: 2.7 K/UL (ref 1–4.8)
LYMPHOCYTES NFR BLD: 36.2 % (ref 18–48)
MCH RBC QN AUTO: 31.4 PG (ref 27–31)
MCHC RBC AUTO-ENTMCNC: 33.5 G/DL (ref 32–36)
MCV RBC AUTO: 94 FL (ref 82–98)
MONOCYTES # BLD AUTO: 0.4 K/UL (ref 0.3–1)
MONOCYTES NFR BLD: 5.2 % (ref 4–15)
NEUTROPHILS # BLD AUTO: 3.3 K/UL (ref 1.8–7.7)
NEUTROPHILS NFR BLD: 44.7 % (ref 38–73)
NONHDLC SERPL-MCNC: 90 MG/DL
NRBC BLD-RTO: 0 /100 WBC
PLATELET # BLD AUTO: 185 K/UL (ref 150–350)
PMV BLD AUTO: 11 FL (ref 9.2–12.9)
POTASSIUM SERPL-SCNC: 4.2 MMOL/L (ref 3.5–5.1)
PROT SERPL-MCNC: 6.9 G/DL (ref 6–8.4)
RBC # BLD AUTO: 4.3 M/UL (ref 4.6–6.2)
SODIUM SERPL-SCNC: 143 MMOL/L (ref 136–145)
TRIGL SERPL-MCNC: 43 MG/DL (ref 30–150)
WBC # BLD AUTO: 7.44 K/UL (ref 3.9–12.7)

## 2020-09-21 PROCEDURE — 36415 COLL VENOUS BLD VENIPUNCTURE: CPT

## 2020-09-21 PROCEDURE — 80053 COMPREHEN METABOLIC PANEL: CPT

## 2020-09-21 PROCEDURE — 99213 OFFICE O/P EST LOW 20 MIN: CPT | Mod: PBBFAC,PO | Performed by: FAMILY MEDICINE

## 2020-09-21 PROCEDURE — 85025 COMPLETE CBC W/AUTO DIFF WBC: CPT

## 2020-09-21 PROCEDURE — 99999 PR PBB SHADOW E&M-EST. PATIENT-LVL III: ICD-10-PCS | Mod: PBBFAC,,, | Performed by: FAMILY MEDICINE

## 2020-09-21 PROCEDURE — 99213 OFFICE O/P EST LOW 20 MIN: CPT | Mod: S$PBB,,, | Performed by: FAMILY MEDICINE

## 2020-09-21 PROCEDURE — 99213 PR OFFICE/OUTPT VISIT, EST, LEVL III, 20-29 MIN: ICD-10-PCS | Mod: S$PBB,,, | Performed by: FAMILY MEDICINE

## 2020-09-21 PROCEDURE — 99999 PR PBB SHADOW E&M-EST. PATIENT-LVL III: CPT | Mod: PBBFAC,,, | Performed by: FAMILY MEDICINE

## 2020-09-21 PROCEDURE — 80061 LIPID PANEL: CPT

## 2020-09-21 RX ORDER — DEXTROAMPHETAMINE SACCHARATE, AMPHETAMINE ASPARTATE, DEXTROAMPHETAMINE SULFATE AND AMPHETAMINE SULFATE 7.5; 7.5; 7.5; 7.5 MG/1; MG/1; MG/1; MG/1
30 TABLET ORAL DAILY
Qty: 30 TABLET | Refills: 0 | Status: SHIPPED | OUTPATIENT
Start: 2020-09-21 | End: 2020-11-25 | Stop reason: SDUPTHER

## 2020-09-21 RX ORDER — DEXTROAMPHETAMINE SACCHARATE, AMPHETAMINE ASPARTATE, DEXTROAMPHETAMINE SULFATE AND AMPHETAMINE SULFATE 7.5; 7.5; 7.5; 7.5 MG/1; MG/1; MG/1; MG/1
30 TABLET ORAL DAILY
Qty: 30 TABLET | Refills: 0 | Status: SHIPPED | OUTPATIENT
Start: 2020-09-21 | End: 2020-09-21 | Stop reason: SDUPTHER

## 2020-09-21 NOTE — PROGRESS NOTES
Subjective:   Patient ID: Sajan Lane is a 34 y.o. male     Chief Complaint:Establish Care      Patient for checkup.  Patient for medication refills.    Review of Systems   Constitutional: Negative for chills and fever.   HENT: Negative for sore throat and trouble swallowing.    Respiratory: Negative for cough and shortness of breath.    Cardiovascular: Negative for chest pain and leg swelling.   Gastrointestinal: Negative for abdominal distention and abdominal pain.   Genitourinary: Negative for dysuria and flank pain.   Musculoskeletal: Negative for arthralgias and back pain.   Skin: Negative for color change and pallor.   Neurological: Negative for weakness and headaches.   Psychiatric/Behavioral: Negative for agitation and confusion.     Past Medical History:   Diagnosis Date    Dyslexia      Past Surgical History:   Procedure Laterality Date    KNEE SURGERY      reconstruction left knee from birth age 4      Objective:     Vitals:    09/21/20 0932   BP: 110/80   Pulse: (!) 55   Temp: 97.6 °F (36.4 °C)     Body mass index is 29.52 kg/m².  Physical Exam  Vitals signs and nursing note reviewed.   Constitutional:       Appearance: He is well-developed.   HENT:      Head: Normocephalic and atraumatic.   Eyes:      General: No scleral icterus.     Conjunctiva/sclera: Conjunctivae normal.   Neck:      Musculoskeletal: Normal range of motion and neck supple.   Cardiovascular:      Heart sounds: No murmur.   Pulmonary:      Effort: Pulmonary effort is normal. No respiratory distress.   Musculoskeletal: Normal range of motion.         General: No deformity.   Skin:     Coloration: Skin is not pale.      Findings: No rash.   Neurological:      Mental Status: He is alert and oriented to person, place, and time.   Psychiatric:         Behavior: Behavior normal.         Thought Content: Thought content normal.         Judgment: Judgment normal.       Assessment:     1. Attention deficit hyperactivity disorder  (ADHD), predominantly inattentive type      Plan:   Attention deficit hyperactivity disorder (ADHD), predominantly inattentive type  -     Discontinue: dextroamphetamine-amphetamine 30 mg Tab; Take 1 tablet (30 mg total) by mouth once daily.  Dispense: 30 tablet; Refill: 0  -     Discontinue: dextroamphetamine-amphetamine 30 mg Tab; Take 1 tablet (30 mg total) by mouth once daily. Do not fill until 10/21/2020  Dispense: 30 tablet; Refill: 0  -     dextroamphetamine-amphetamine 30 mg Tab; Take 1 tablet (30 mg total) by mouth once daily. Do not fill until 11/21/2020  Dispense: 30 tablet; Refill: 0  -     Lipid Panel; Future; Expected date: 09/21/2020  -     Comprehensive metabolic panel; Future; Expected date: 09/21/2020  -     CBC auto differential; Future; Expected date: 09/21/2020            Time spent with patient: 15 minutes and over half of that time was spent on counseling an coordination of care.    Bandar Colmenares MD  09/21/2020    Portions of this note have been dictated with SHAREE Whelan

## 2021-01-04 ENCOUNTER — OFFICE VISIT (OUTPATIENT)
Dept: FAMILY MEDICINE | Facility: CLINIC | Age: 36
End: 2021-01-04

## 2021-01-04 VITALS
DIASTOLIC BLOOD PRESSURE: 80 MMHG | OXYGEN SATURATION: 97 % | TEMPERATURE: 99 F | RESPIRATION RATE: 18 BRPM | HEART RATE: 60 BPM | HEIGHT: 71 IN | SYSTOLIC BLOOD PRESSURE: 110 MMHG | WEIGHT: 211.63 LBS | BODY MASS INDEX: 29.63 KG/M2

## 2021-01-04 DIAGNOSIS — F90.0 ATTENTION DEFICIT HYPERACTIVITY DISORDER (ADHD), PREDOMINANTLY INATTENTIVE TYPE: ICD-10-CM

## 2021-01-04 PROCEDURE — 99999 PR PBB SHADOW E&M-EST. PATIENT-LVL III: ICD-10-PCS | Mod: PBBFAC,,, | Performed by: FAMILY MEDICINE

## 2021-01-04 PROCEDURE — 99213 OFFICE O/P EST LOW 20 MIN: CPT | Mod: S$PBB,,, | Performed by: FAMILY MEDICINE

## 2021-01-04 PROCEDURE — 99213 PR OFFICE/OUTPT VISIT, EST, LEVL III, 20-29 MIN: ICD-10-PCS | Mod: S$PBB,,, | Performed by: FAMILY MEDICINE

## 2021-01-04 PROCEDURE — 99213 OFFICE O/P EST LOW 20 MIN: CPT | Mod: PBBFAC,PO | Performed by: FAMILY MEDICINE

## 2021-01-04 PROCEDURE — 99999 PR PBB SHADOW E&M-EST. PATIENT-LVL III: CPT | Mod: PBBFAC,,, | Performed by: FAMILY MEDICINE

## 2021-01-04 RX ORDER — DEXTROAMPHETAMINE SACCHARATE, AMPHETAMINE ASPARTATE, DEXTROAMPHETAMINE SULFATE AND AMPHETAMINE SULFATE 7.5; 7.5; 7.5; 7.5 MG/1; MG/1; MG/1; MG/1
30 TABLET ORAL DAILY
Qty: 30 TABLET | Refills: 0 | Status: SHIPPED | OUTPATIENT
Start: 2021-01-04 | End: 2021-01-04 | Stop reason: SDUPTHER

## 2021-01-04 RX ORDER — DEXTROAMPHETAMINE SACCHARATE, AMPHETAMINE ASPARTATE, DEXTROAMPHETAMINE SULFATE AND AMPHETAMINE SULFATE 7.5; 7.5; 7.5; 7.5 MG/1; MG/1; MG/1; MG/1
30 TABLET ORAL DAILY
Qty: 30 TABLET | Refills: 0 | Status: SHIPPED | OUTPATIENT
Start: 2021-01-04 | End: 2021-03-15 | Stop reason: SDUPTHER

## 2021-01-21 DIAGNOSIS — F41.9 ANXIETY AND DEPRESSION: ICD-10-CM

## 2021-01-21 DIAGNOSIS — F32.A ANXIETY AND DEPRESSION: ICD-10-CM

## 2021-01-21 DIAGNOSIS — F43.23 ADJUSTMENT REACTION WITH ANXIETY AND DEPRESSION: ICD-10-CM

## 2021-01-21 RX ORDER — CITALOPRAM 20 MG/1
20 TABLET, FILM COATED ORAL DAILY
Qty: 90 TABLET | Refills: 1 | Status: SHIPPED | OUTPATIENT
Start: 2021-01-21 | End: 2021-03-13 | Stop reason: SDUPTHER

## 2021-03-13 DIAGNOSIS — F90.0 ATTENTION DEFICIT HYPERACTIVITY DISORDER (ADHD), PREDOMINANTLY INATTENTIVE TYPE: ICD-10-CM

## 2021-03-15 ENCOUNTER — PATIENT MESSAGE (OUTPATIENT)
Dept: FAMILY MEDICINE | Facility: CLINIC | Age: 36
End: 2021-03-15

## 2021-03-15 DIAGNOSIS — F32.A ANXIETY AND DEPRESSION: ICD-10-CM

## 2021-03-15 DIAGNOSIS — F41.9 ANXIETY AND DEPRESSION: ICD-10-CM

## 2021-03-15 RX ORDER — DEXTROAMPHETAMINE SACCHARATE, AMPHETAMINE ASPARTATE, DEXTROAMPHETAMINE SULFATE AND AMPHETAMINE SULFATE 7.5; 7.5; 7.5; 7.5 MG/1; MG/1; MG/1; MG/1
30 TABLET ORAL DAILY
Qty: 30 TABLET | Refills: 0 | OUTPATIENT
Start: 2021-03-15

## 2021-03-15 RX ORDER — BUSPIRONE HYDROCHLORIDE 5 MG/1
5 TABLET ORAL 2 TIMES DAILY
Qty: 180 TABLET | Refills: 1 | Status: SHIPPED | OUTPATIENT
Start: 2021-03-15 | End: 2021-06-04 | Stop reason: SDUPTHER

## 2021-04-09 ENCOUNTER — OFFICE VISIT (OUTPATIENT)
Dept: FAMILY MEDICINE | Facility: CLINIC | Age: 36
End: 2021-04-09

## 2021-04-09 VITALS
HEIGHT: 71 IN | BODY MASS INDEX: 27.26 KG/M2 | OXYGEN SATURATION: 97 % | DIASTOLIC BLOOD PRESSURE: 76 MMHG | SYSTOLIC BLOOD PRESSURE: 122 MMHG | WEIGHT: 194.69 LBS | HEART RATE: 70 BPM

## 2021-04-09 DIAGNOSIS — F32.A ANXIETY AND DEPRESSION: ICD-10-CM

## 2021-04-09 DIAGNOSIS — F41.9 ANXIETY AND DEPRESSION: ICD-10-CM

## 2021-04-09 DIAGNOSIS — F43.23 ADJUSTMENT REACTION WITH ANXIETY AND DEPRESSION: ICD-10-CM

## 2021-04-09 PROCEDURE — 99999 PR PBB SHADOW E&M-EST. PATIENT-LVL III: CPT | Mod: PBBFAC,,, | Performed by: FAMILY MEDICINE

## 2021-04-09 PROCEDURE — 99213 OFFICE O/P EST LOW 20 MIN: CPT | Mod: S$PBB,,, | Performed by: FAMILY MEDICINE

## 2021-04-09 PROCEDURE — 99213 OFFICE O/P EST LOW 20 MIN: CPT | Mod: PBBFAC,PO | Performed by: FAMILY MEDICINE

## 2021-04-09 PROCEDURE — 99999 PR PBB SHADOW E&M-EST. PATIENT-LVL III: ICD-10-PCS | Mod: PBBFAC,,, | Performed by: FAMILY MEDICINE

## 2021-04-09 PROCEDURE — 99213 PR OFFICE/OUTPT VISIT, EST, LEVL III, 20-29 MIN: ICD-10-PCS | Mod: S$PBB,,, | Performed by: FAMILY MEDICINE

## 2021-04-09 RX ORDER — CITALOPRAM 40 MG/1
40 TABLET, FILM COATED ORAL DAILY
Qty: 90 TABLET | Refills: 3 | Status: SHIPPED | OUTPATIENT
Start: 2021-04-09 | End: 2022-03-10 | Stop reason: SDUPTHER

## 2021-07-15 ENCOUNTER — OFFICE VISIT (OUTPATIENT)
Dept: FAMILY MEDICINE | Facility: CLINIC | Age: 36
End: 2021-07-15

## 2021-07-15 VITALS
BODY MASS INDEX: 29.44 KG/M2 | DIASTOLIC BLOOD PRESSURE: 82 MMHG | HEART RATE: 58 BPM | HEIGHT: 71 IN | SYSTOLIC BLOOD PRESSURE: 120 MMHG | OXYGEN SATURATION: 98 % | WEIGHT: 210.31 LBS

## 2021-07-15 DIAGNOSIS — F41.9 ANXIETY: ICD-10-CM

## 2021-07-15 DIAGNOSIS — F90.0 ATTENTION DEFICIT HYPERACTIVITY DISORDER (ADHD), PREDOMINANTLY INATTENTIVE TYPE: ICD-10-CM

## 2021-07-15 PROCEDURE — 99214 OFFICE O/P EST MOD 30 MIN: CPT | Mod: S$PBB,,, | Performed by: FAMILY MEDICINE

## 2021-07-15 PROCEDURE — 99214 PR OFFICE/OUTPT VISIT, EST, LEVL IV, 30-39 MIN: ICD-10-PCS | Mod: S$PBB,,, | Performed by: FAMILY MEDICINE

## 2021-07-15 PROCEDURE — 99213 OFFICE O/P EST LOW 20 MIN: CPT | Mod: PBBFAC,PO | Performed by: FAMILY MEDICINE

## 2021-07-15 PROCEDURE — 99999 PR PBB SHADOW E&M-EST. PATIENT-LVL III: ICD-10-PCS | Mod: PBBFAC,,, | Performed by: FAMILY MEDICINE

## 2021-07-15 PROCEDURE — 99999 PR PBB SHADOW E&M-EST. PATIENT-LVL III: CPT | Mod: PBBFAC,,, | Performed by: FAMILY MEDICINE

## 2021-07-15 RX ORDER — DEXTROAMPHETAMINE SACCHARATE, AMPHETAMINE ASPARTATE, DEXTROAMPHETAMINE SULFATE AND AMPHETAMINE SULFATE 7.5; 7.5; 7.5; 7.5 MG/1; MG/1; MG/1; MG/1
30 TABLET ORAL DAILY
Qty: 30 TABLET | Refills: 0 | Status: SHIPPED | OUTPATIENT
Start: 2021-07-15 | End: 2021-07-15 | Stop reason: SDUPTHER

## 2021-07-15 RX ORDER — DEXTROAMPHETAMINE SACCHARATE, AMPHETAMINE ASPARTATE, DEXTROAMPHETAMINE SULFATE AND AMPHETAMINE SULFATE 7.5; 7.5; 7.5; 7.5 MG/1; MG/1; MG/1; MG/1
30 TABLET ORAL DAILY
Qty: 30 TABLET | Refills: 0 | Status: SHIPPED | OUTPATIENT
Start: 2021-07-15 | End: 2021-08-19 | Stop reason: SDUPTHER

## 2021-07-16 PROBLEM — F41.9 ANXIETY: Status: ACTIVE | Noted: 2021-07-16

## 2021-07-16 PROBLEM — F90.0 ATTENTION DEFICIT HYPERACTIVITY DISORDER (ADHD), PREDOMINANTLY INATTENTIVE TYPE: Status: ACTIVE | Noted: 2021-07-16

## 2021-08-02 ENCOUNTER — PATIENT MESSAGE (OUTPATIENT)
Dept: FAMILY MEDICINE | Facility: CLINIC | Age: 36
End: 2021-08-02

## 2021-08-02 DIAGNOSIS — F32.A DEPRESSION, UNSPECIFIED DEPRESSION TYPE: Primary | ICD-10-CM

## 2021-08-02 DIAGNOSIS — F32.A ANXIETY AND DEPRESSION: ICD-10-CM

## 2021-08-02 DIAGNOSIS — F41.9 ANXIETY AND DEPRESSION: ICD-10-CM

## 2021-08-02 RX ORDER — BUSPIRONE HYDROCHLORIDE 5 MG/1
5 TABLET ORAL 3 TIMES DAILY
Qty: 270 TABLET | Refills: 3 | Status: SHIPPED | OUTPATIENT
Start: 2021-08-02 | End: 2022-07-26

## 2021-08-19 DIAGNOSIS — F90.0 ATTENTION DEFICIT HYPERACTIVITY DISORDER (ADHD), PREDOMINANTLY INATTENTIVE TYPE: ICD-10-CM

## 2021-08-23 RX ORDER — DEXTROAMPHETAMINE SACCHARATE, AMPHETAMINE ASPARTATE, DEXTROAMPHETAMINE SULFATE AND AMPHETAMINE SULFATE 7.5; 7.5; 7.5; 7.5 MG/1; MG/1; MG/1; MG/1
30 TABLET ORAL DAILY
Qty: 30 TABLET | Refills: 0 | Status: SHIPPED | OUTPATIENT
Start: 2021-08-23 | End: 2021-10-18 | Stop reason: SDUPTHER

## 2021-09-01 ENCOUNTER — PATIENT MESSAGE (OUTPATIENT)
Dept: FAMILY MEDICINE | Facility: CLINIC | Age: 36
End: 2021-09-01

## 2021-10-18 ENCOUNTER — OFFICE VISIT (OUTPATIENT)
Dept: FAMILY MEDICINE | Facility: CLINIC | Age: 36
End: 2021-10-18

## 2021-10-18 ENCOUNTER — LAB VISIT (OUTPATIENT)
Dept: LAB | Facility: HOSPITAL | Age: 36
End: 2021-10-18
Attending: FAMILY MEDICINE

## 2021-10-18 VITALS
HEIGHT: 71 IN | TEMPERATURE: 99 F | DIASTOLIC BLOOD PRESSURE: 76 MMHG | HEART RATE: 68 BPM | BODY MASS INDEX: 29.87 KG/M2 | OXYGEN SATURATION: 97 % | WEIGHT: 213.38 LBS | SYSTOLIC BLOOD PRESSURE: 118 MMHG

## 2021-10-18 DIAGNOSIS — F90.0 ATTENTION DEFICIT HYPERACTIVITY DISORDER (ADHD), PREDOMINANTLY INATTENTIVE TYPE: ICD-10-CM

## 2021-10-18 DIAGNOSIS — F41.9 ANXIETY: Primary | ICD-10-CM

## 2021-10-18 LAB
ALBUMIN SERPL BCP-MCNC: 4.1 G/DL (ref 3.5–5.2)
ALP SERPL-CCNC: 75 U/L (ref 55–135)
ALT SERPL W/O P-5'-P-CCNC: 33 U/L (ref 10–44)
ANION GAP SERPL CALC-SCNC: 11 MMOL/L (ref 8–16)
AST SERPL-CCNC: 25 U/L (ref 10–40)
BILIRUB SERPL-MCNC: 0.8 MG/DL (ref 0.1–1)
BUN SERPL-MCNC: 19 MG/DL (ref 6–20)
CALCIUM SERPL-MCNC: 9.3 MG/DL (ref 8.7–10.5)
CHLORIDE SERPL-SCNC: 103 MMOL/L (ref 95–110)
CHOLEST SERPL-MCNC: 116 MG/DL (ref 120–199)
CHOLEST/HDLC SERPL: 2.5 {RATIO} (ref 2–5)
CO2 SERPL-SCNC: 26 MMOL/L (ref 23–29)
CREAT SERPL-MCNC: 0.9 MG/DL (ref 0.5–1.4)
EST. GFR  (AFRICAN AMERICAN): >60 ML/MIN/1.73 M^2
EST. GFR  (NON AFRICAN AMERICAN): >60 ML/MIN/1.73 M^2
GLUCOSE SERPL-MCNC: 75 MG/DL (ref 70–110)
HDLC SERPL-MCNC: 46 MG/DL (ref 40–75)
HDLC SERPL: 39.7 % (ref 20–50)
LDLC SERPL CALC-MCNC: 48.2 MG/DL (ref 63–159)
NONHDLC SERPL-MCNC: 70 MG/DL
POTASSIUM SERPL-SCNC: 3.9 MMOL/L (ref 3.5–5.1)
PROT SERPL-MCNC: 6.8 G/DL (ref 6–8.4)
SODIUM SERPL-SCNC: 140 MMOL/L (ref 136–145)
TRIGL SERPL-MCNC: 109 MG/DL (ref 30–150)

## 2021-10-18 PROCEDURE — 99214 OFFICE O/P EST MOD 30 MIN: CPT | Mod: PBBFAC,PO | Performed by: FAMILY MEDICINE

## 2021-10-18 PROCEDURE — 80053 COMPREHEN METABOLIC PANEL: CPT | Performed by: FAMILY MEDICINE

## 2021-10-18 PROCEDURE — 99214 PR OFFICE/OUTPT VISIT, EST, LEVL IV, 30-39 MIN: ICD-10-PCS | Mod: S$PBB,,, | Performed by: FAMILY MEDICINE

## 2021-10-18 PROCEDURE — 99999 PR PBB SHADOW E&M-EST. PATIENT-LVL IV: ICD-10-PCS | Mod: PBBFAC,,, | Performed by: FAMILY MEDICINE

## 2021-10-18 PROCEDURE — 99214 OFFICE O/P EST MOD 30 MIN: CPT | Mod: S$PBB,,, | Performed by: FAMILY MEDICINE

## 2021-10-18 PROCEDURE — 80061 LIPID PANEL: CPT | Performed by: FAMILY MEDICINE

## 2021-10-18 PROCEDURE — 99999 PR PBB SHADOW E&M-EST. PATIENT-LVL IV: CPT | Mod: PBBFAC,,, | Performed by: FAMILY MEDICINE

## 2021-10-18 PROCEDURE — 36415 COLL VENOUS BLD VENIPUNCTURE: CPT | Mod: PO | Performed by: FAMILY MEDICINE

## 2021-10-18 RX ORDER — DEXTROAMPHETAMINE SACCHARATE, AMPHETAMINE ASPARTATE, DEXTROAMPHETAMINE SULFATE AND AMPHETAMINE SULFATE 7.5; 7.5; 7.5; 7.5 MG/1; MG/1; MG/1; MG/1
1 TABLET ORAL DAILY
Qty: 30 TABLET | Refills: 0 | Status: SHIPPED | OUTPATIENT
Start: 2021-10-18 | End: 2022-07-26

## 2021-10-18 RX ORDER — DEXTROAMPHETAMINE SACCHARATE, AMPHETAMINE ASPARTATE, DEXTROAMPHETAMINE SULFATE AND AMPHETAMINE SULFATE 7.5; 7.5; 7.5; 7.5 MG/1; MG/1; MG/1; MG/1
1 TABLET ORAL DAILY
Qty: 30 TABLET | Refills: 0 | Status: SHIPPED | OUTPATIENT
Start: 2021-10-18 | End: 2022-03-10 | Stop reason: SDUPTHER

## 2021-10-18 RX ORDER — DEXTROAMPHETAMINE SACCHARATE, AMPHETAMINE ASPARTATE, DEXTROAMPHETAMINE SULFATE AND AMPHETAMINE SULFATE 7.5; 7.5; 7.5; 7.5 MG/1; MG/1; MG/1; MG/1
30 TABLET ORAL DAILY
Qty: 30 TABLET | Refills: 0 | Status: SHIPPED | OUTPATIENT
Start: 2021-10-18 | End: 2021-12-30 | Stop reason: SDUPTHER

## 2022-01-31 ENCOUNTER — HOSPITAL ENCOUNTER (EMERGENCY)
Facility: HOSPITAL | Age: 37
Discharge: HOME | End: 2022-01-31
Payer: COMMERCIAL

## 2022-01-31 VITALS
RESPIRATION RATE: 18 BRPM | SYSTOLIC BLOOD PRESSURE: 141 MMHG | HEART RATE: 64 BPM | WEIGHT: 212 LBS | HEIGHT: 71 IN | DIASTOLIC BLOOD PRESSURE: 89 MMHG | OXYGEN SATURATION: 96 % | BODY MASS INDEX: 29.68 KG/M2 | TEMPERATURE: 98 F

## 2022-01-31 DIAGNOSIS — S29.019A THORACIC MYOFASCIAL STRAIN, INITIAL ENCOUNTER: ICD-10-CM

## 2022-01-31 DIAGNOSIS — S39.012A ACUTE MYOFASCIAL STRAIN OF LUMBAR REGION, INITIAL ENCOUNTER: ICD-10-CM
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
## 2022-01-31 DIAGNOSIS — S16.1XXA CERVICAL MYOFASCIAL STRAIN, INITIAL ENCOUNTER: ICD-10-CM

## 2022-01-31 PROCEDURE — 99283 EMERGENCY DEPT VISIT LOW MDM: CPT

## 2022-01-31 RX ORDER — METHOCARBAMOL 500 MG/1
1000 TABLET, FILM COATED ORAL 3 TIMES DAILY
Qty: 30 TABLET | Refills: 0 | Status: SHIPPED | OUTPATIENT
Start: 2022-01-31 | End: 2022-02-05

## 2022-01-31 RX ORDER — IBUPROFEN 600 MG/1
600 TABLET ORAL EVERY 6 HOURS PRN
Qty: 30 TABLET | Refills: 0 | Status: SHIPPED | OUTPATIENT
Start: 2022-01-31 | End: 2022-02-07
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
## 2022-02-02 ENCOUNTER — PATIENT MESSAGE (OUTPATIENT)
Dept: FAMILY MEDICINE | Facility: CLINIC | Age: 37
End: 2022-02-02

## 2022-02-07 ENCOUNTER — OFFICE VISIT (OUTPATIENT)
Dept: FAMILY MEDICINE | Facility: CLINIC | Age: 37
End: 2022-02-07
Payer: COMMERCIAL

## 2022-02-07 VITALS
RESPIRATION RATE: 16 BRPM | HEIGHT: 71 IN | TEMPERATURE: 99 F | BODY MASS INDEX: 30.4 KG/M2 | DIASTOLIC BLOOD PRESSURE: 70 MMHG | WEIGHT: 217.13 LBS | HEART RATE: 76 BPM | SYSTOLIC BLOOD PRESSURE: 132 MMHG | OXYGEN SATURATION: 96 %

## 2022-02-07 DIAGNOSIS — M54.9 MID BACK PAIN: Primary | ICD-10-CM

## 2022-02-07 DIAGNOSIS — M54.6 PAIN IN THORACIC SPINE: ICD-10-CM
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
## 2022-02-07 DIAGNOSIS — M54.9 DORSALGIA, UNSPECIFIED: ICD-10-CM

## 2022-02-07 PROCEDURE — 99213 PR OFFICE/OUTPT VISIT, EST, LEVL III, 20-29 MIN: ICD-10-PCS | Mod: S$GLB,,,

## 2022-02-07 PROCEDURE — 99999 PR PBB SHADOW E&M-EST. PATIENT-LVL V: ICD-10-PCS | Mod: PBBFAC,,,

## 2022-02-07 PROCEDURE — 99215 OFFICE O/P EST HI 40 MIN: CPT | Mod: PBBFAC,PO

## 2022-02-07 PROCEDURE — 99213 OFFICE O/P EST LOW 20 MIN: CPT | Mod: S$GLB,,,

## 2022-02-07 PROCEDURE — 99999 PR PBB SHADOW E&M-EST. PATIENT-LVL V: CPT | Mod: PBBFAC,,,

## 2022-02-07 RX ORDER — IBUPROFEN 800 MG/1
800 TABLET ORAL 3 TIMES DAILY
Qty: 60 TABLET | Refills: 0 | Status: SHIPPED | OUTPATIENT
Start: 2022-02-07 | End: 2022-03-10 | Stop reason: SDUPTHER

## 2022-02-07 RX ORDER — TIZANIDINE 2 MG/1
2 TABLET ORAL EVERY 6 HOURS PRN
Qty: 90 TABLET | Refills: 0 | Status: SHIPPED | OUTPATIENT
Start: 2022-02-07 | End: 2022-02-17

## 2022-02-07 RX ORDER — METHOCARBAMOL 500 MG/1
500 TABLET, FILM COATED ORAL 4 TIMES DAILY
COMMUNITY
End: 2022-02-10 | Stop reason: SDUPTHER

## 2022-02-07 RX ORDER — LIDOCAINE 50 MG/G
1 PATCH TOPICAL DAILY
Qty: 30 PATCH | Refills: 0 | Status: SHIPPED | OUTPATIENT
Start: 2022-02-07 | End: 2023-08-21 | Stop reason: ALTCHOICE

## 2022-02-07 NOTE — PATIENT INSTRUCTIONS
If you are due for any health screening(s) below please notify me so we can arrange them to be ordered and scheduled to maintain your health.     Health Maintenance   Topic Date Due    Hepatitis C Screening  Never done    TETANUS VACCINE  Never done    Lipid Panel  10/18/2026
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
## 2022-02-09 ENCOUNTER — PATIENT MESSAGE (OUTPATIENT)
Dept: FAMILY MEDICINE | Facility: CLINIC | Age: 37
End: 2022-02-09

## 2022-02-10 ENCOUNTER — TELEPHONE (OUTPATIENT)
Dept: FAMILY MEDICINE | Facility: CLINIC | Age: 37
End: 2022-02-10

## 2022-02-10 ENCOUNTER — PATIENT MESSAGE (OUTPATIENT)
Dept: FAMILY MEDICINE | Facility: CLINIC | Age: 37
End: 2022-02-10
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
## 2022-02-10 DIAGNOSIS — G62.9 NEUROPATHY: Primary | ICD-10-CM

## 2022-02-10 RX ORDER — MELOXICAM 7.5 MG/1
7.5 TABLET ORAL 2 TIMES DAILY PRN
Qty: 60 TABLET | Refills: 1 | Status: SHIPPED | OUTPATIENT
Start: 2022-02-10 | End: 2022-03-10 | Stop reason: SDUPTHER

## 2022-02-10 RX ORDER — METHOCARBAMOL 500 MG/1
500 TABLET, FILM COATED ORAL 4 TIMES DAILY PRN
Qty: 90 TABLET | Refills: 1 | Status: SHIPPED | OUTPATIENT
Start: 2022-02-10 | End: 2022-03-10 | Stop reason: SDUPTHER

## 2022-02-10 NOTE — TELEPHONE ENCOUNTER
Called in meloxicam to take as needed for pain. Called in robaxin as well but would only take at night or if not driving anywhere. Robaxin is a muscle relaxer and can make you drowsy.

## 2022-02-10 NOTE — TELEPHONE ENCOUNTER
----- Message from Talya Dubon sent at 2/10/2022  1:42 PM CST -----  Regarding: Patient  Contact: Patient  Patient left message on [employer] regarding his pain need medical advice, please call back at [phone] (home)
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
## 2022-02-22 ENCOUNTER — PATIENT MESSAGE (OUTPATIENT)
Dept: FAMILY MEDICINE | Facility: CLINIC | Age: 37
End: 2022-02-22

## 2022-02-22 NOTE — TELEPHONE ENCOUNTER
Spoke with patient regarding referral sent from PCP. Patient is scheduled for March 2 at 9 am with Yohana Eckert NP for numbness

## 2022-02-23 ENCOUNTER — TELEPHONE (OUTPATIENT)
Dept: NEUROLOGY | Facility: CLINIC | Age: 37
End: 2022-02-23

## 2022-02-23 NOTE — TELEPHONE ENCOUNTER
----- Message from J Luis Woods sent at 2/23/2022 12:00 PM CST -----  Patient called to speak w/ someone in regards to rescheduling his appt on 3/2 to 3/3 instead if possible. Requesting callback [phone]

## 2022-03-03 ENCOUNTER — HOSPITAL ENCOUNTER (OUTPATIENT)
Dept: RADIOLOGY | Facility: HOSPITAL | Age: 37
Discharge: HOME | End: 2022-03-03
Attending: NURSE PRACTITIONER

## 2022-03-03 ENCOUNTER — TELEPHONE (OUTPATIENT)
Dept: NEUROLOGY | Facility: CLINIC | Age: 37
End: 2022-03-03

## 2022-03-03 ENCOUNTER — OFFICE VISIT (OUTPATIENT)
Dept: NEUROLOGY | Facility: CLINIC | Age: 37
End: 2022-03-03

## 2022-03-03 VITALS
BODY MASS INDEX: 29.67 KG/M2 | HEART RATE: 65 BPM | SYSTOLIC BLOOD PRESSURE: 121 MMHG | TEMPERATURE: 98 F | DIASTOLIC BLOOD PRESSURE: 76 MMHG | RESPIRATION RATE: 16 BRPM | WEIGHT: 212.75 LBS

## 2022-03-03 DIAGNOSIS — M54.50 ACUTE RIGHT-SIDED LOW BACK PAIN WITHOUT SCIATICA: ICD-10-CM

## 2022-03-03 DIAGNOSIS — M54.2 NECK PAIN: ICD-10-CM

## 2022-03-03 DIAGNOSIS — M54.6 ACUTE MIDLINE THORACIC BACK PAIN: ICD-10-CM

## 2022-03-03 DIAGNOSIS — G62.9 NEUROPATHY: ICD-10-CM

## 2022-03-03 DIAGNOSIS — R20.2 ARM PARESTHESIA, LEFT: Primary | ICD-10-CM

## 2022-03-03 PROCEDURE — 99215 OFFICE O/P EST HI 40 MIN: CPT | Mod: PBBFAC,PO | Performed by: NURSE PRACTITIONER

## 2022-03-03 PROCEDURE — 99205 OFFICE O/P NEW HI 60 MIN: CPT | Mod: S$PBB,,, | Performed by: NURSE PRACTITIONER

## 2022-03-03 PROCEDURE — 99999 PR PBB SHADOW E&M-EST. PATIENT-LVL V: ICD-10-PCS | Mod: PBBFAC,,, | Performed by: NURSE PRACTITIONER

## 2022-03-03 PROCEDURE — 99999 PR PBB SHADOW E&M-EST. PATIENT-LVL V: CPT | Mod: PBBFAC,,, | Performed by: NURSE PRACTITIONER

## 2022-03-03 PROCEDURE — 72114 X-RAY EXAM L-S SPINE BENDING: CPT | Mod: 26,,, | Performed by: RADIOLOGY

## 2022-03-03 PROCEDURE — 72052 X-RAY EXAM NECK SPINE 6/>VWS: CPT | Mod: 26,,, | Performed by: RADIOLOGY

## 2022-03-03 PROCEDURE — 72114 X-RAY EXAM L-S SPINE BENDING: CPT | Mod: TC,FY,PO

## 2022-03-03 PROCEDURE — 99205 PR OFFICE/OUTPT VISIT, NEW, LEVL V, 60-74 MIN: ICD-10-PCS | Mod: S$PBB,,, | Performed by: NURSE PRACTITIONER

## 2022-03-03 PROCEDURE — 72052 XR CERVICAL SPINE 5 VIEW WITH FLEX AND EXT: ICD-10-PCS | Mod: 26,,, | Performed by: RADIOLOGY

## 2022-03-03 PROCEDURE — 72052 X-RAY EXAM NECK SPINE 6/>VWS: CPT | Mod: TC,FY,PO

## 2022-03-03 PROCEDURE — 72114 XR LUMBAR SPINE 5 VIEW WITH FLEX AND EXT: ICD-10-PCS | Mod: 26,,, | Performed by: RADIOLOGY
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
## 2022-03-03 NOTE — PATIENT INSTRUCTIONS
We will get a nerve conduction study of the Left arm to further evaluate the symptoms you are having.     We will also get some xrays of the neck and lower back.     Based on what these tests show, we can consider MRI if warranted.     Continue the methocarbamol (muscle relaxant) as you are doing.     I would not advise taking too much of the ibuprofen and meloxicam at one time (they act similarly and can cause irritation of the stomach). You could try to add extra strength Tylenol with the meloxicam instead of combining it with the ibuprofen.     Continue the lidocaine patches. You can also try OTC voltaren gel.     You can do stretches, but I would not lift weights.

## 2022-03-03 NOTE — ASSESSMENT & PLAN NOTE
Cautioned on use of high dose NSAIDs as he is doing  Continue muscle relaxant and topical lidocaine  Ok to do stretches, but do not lift weights  Based on our evaluation, may need referral to back and spine or for PT

## 2022-03-04 ENCOUNTER — PROCEDURE VISIT (OUTPATIENT)
Dept: NEUROLOGY | Facility: CLINIC | Age: 37
End: 2022-03-04

## 2022-03-04 ENCOUNTER — TELEPHONE (OUTPATIENT)
Dept: NEUROLOGY | Facility: CLINIC | Age: 37
End: 2022-03-04

## 2022-03-04 ENCOUNTER — PATIENT MESSAGE (OUTPATIENT)
Dept: NEUROLOGY | Facility: CLINIC | Age: 37
End: 2022-03-04

## 2022-03-04 DIAGNOSIS — M54.2 NECK PAIN: ICD-10-CM

## 2022-03-04 DIAGNOSIS — G56.22 ULNAR NEUROPATHY AT ELBOW OF LEFT UPPER EXTREMITY: ICD-10-CM

## 2022-03-04 DIAGNOSIS — G56.02 LEFT CARPAL TUNNEL SYNDROME: Primary | ICD-10-CM

## 2022-03-04 DIAGNOSIS — M54.6 ACUTE MIDLINE THORACIC BACK PAIN: ICD-10-CM

## 2022-03-04 DIAGNOSIS — R20.2 ARM PARESTHESIA, LEFT: ICD-10-CM

## 2022-03-04 PROCEDURE — 95886 MUSC TEST DONE W/N TEST COMP: CPT | Mod: 26,S$PBB,, | Performed by: PSYCHIATRY & NEUROLOGY

## 2022-03-04 PROCEDURE — 95886 PR EMG COMPLETE, W/ NERVE CONDUCTION STUDIES, 5+ MUSCLES: ICD-10-PCS | Mod: 26,S$PBB,, | Performed by: PSYCHIATRY & NEUROLOGY

## 2022-03-04 PROCEDURE — 95886 MUSC TEST DONE W/N TEST COMP: CPT | Mod: PBBFAC,PO | Performed by: PSYCHIATRY & NEUROLOGY

## 2022-03-04 PROCEDURE — 95887 MUSC TST DONE W/N TST NONEXT: CPT | Mod: 26,59,S$PBB, | Performed by: PSYCHIATRY & NEUROLOGY

## 2022-03-04 PROCEDURE — 95887 MUSC TST DONE W/N TST NONEXT: CPT | Mod: 59,PBBFAC,PO | Performed by: PSYCHIATRY & NEUROLOGY

## 2022-03-04 PROCEDURE — 95908 NRV CNDJ TST 3-4 STUDIES: CPT | Mod: 26,S$PBB,, | Performed by: PSYCHIATRY & NEUROLOGY

## 2022-03-04 PROCEDURE — 95908 PR NERVE CONDUCTION STUDY; 3-4 STUDIES: ICD-10-PCS | Mod: 26,S$PBB,, | Performed by: PSYCHIATRY & NEUROLOGY

## 2022-03-04 PROCEDURE — 95887 PR MUSC TST DONE W/N TST NONEXT: ICD-10-PCS | Mod: 26,59,S$PBB, | Performed by: PSYCHIATRY & NEUROLOGY

## 2022-03-04 PROCEDURE — 95908 NRV CNDJ TST 3-4 STUDIES: CPT | Mod: PBBFAC,PO | Performed by: PSYCHIATRY & NEUROLOGY
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
## 2022-03-10 DIAGNOSIS — F90.0 ATTENTION DEFICIT HYPERACTIVITY DISORDER (ADHD), PREDOMINANTLY INATTENTIVE TYPE: ICD-10-CM

## 2022-03-10 DIAGNOSIS — M54.9 MID BACK PAIN: ICD-10-CM

## 2022-03-10 DIAGNOSIS — M54.9 DORSALGIA, UNSPECIFIED: ICD-10-CM
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
## 2022-03-10 DIAGNOSIS — F41.9 ANXIETY AND DEPRESSION: ICD-10-CM

## 2022-03-10 DIAGNOSIS — F43.23 ADJUSTMENT REACTION WITH ANXIETY AND DEPRESSION: ICD-10-CM

## 2022-03-10 DIAGNOSIS — F32.A ANXIETY AND DEPRESSION: ICD-10-CM

## 2022-03-10 DIAGNOSIS — M54.6 PAIN IN THORACIC SPINE: ICD-10-CM

## 2022-03-10 RX ORDER — IBUPROFEN 800 MG/1
800 TABLET ORAL 3 TIMES DAILY
Qty: 30 TABLET | Refills: 0 | Status: SHIPPED | OUTPATIENT
Start: 2022-03-10 | End: 2023-09-04

## 2022-03-10 NOTE — TELEPHONE ENCOUNTER
No new care gaps identified.  Powered by [employer]. Reference number: [id number].   3/10/2022 6:10:58 AM CST

## 2022-03-11 ENCOUNTER — TELEPHONE (OUTPATIENT)
Dept: FAMILY MEDICINE | Facility: CLINIC | Age: 37
End: 2022-03-11

## 2022-03-11 ENCOUNTER — PATIENT MESSAGE (OUTPATIENT)
Dept: FAMILY MEDICINE | Facility: CLINIC | Age: 37
End: 2022-03-11

## 2022-03-11 RX ORDER — METHOCARBAMOL 500 MG/1
500 TABLET, FILM COATED ORAL 4 TIMES DAILY PRN
Qty: 90 TABLET | Refills: 1 | Status: SHIPPED | OUTPATIENT
Start: 2022-03-11 | End: 2023-05-03 | Stop reason: SDUPTHER

## 2022-03-11 RX ORDER — MELOXICAM 7.5 MG/1
7.5 TABLET ORAL 2 TIMES DAILY PRN
Qty: 60 TABLET | Refills: 1 | Status: SHIPPED | OUTPATIENT
Start: 2022-03-11 | End: 2022-07-06 | Stop reason: SDUPTHER

## 2022-03-11 RX ORDER — CITALOPRAM 40 MG/1
40 TABLET, FILM COATED ORAL DAILY
Qty: 90 TABLET | Refills: 3 | Status: SHIPPED | OUTPATIENT
Start: 2022-03-11 | End: 2022-04-13 | Stop reason: SDUPTHER

## 2022-03-11 RX ORDER — DEXTROAMPHETAMINE SACCHARATE, AMPHETAMINE ASPARTATE, DEXTROAMPHETAMINE SULFATE AND AMPHETAMINE SULFATE 7.5; 7.5; 7.5; 7.5 MG/1; MG/1; MG/1; MG/1
1 TABLET ORAL DAILY
Qty: 30 TABLET | Refills: 0 | Status: SHIPPED | OUTPATIENT
Start: 2022-03-11 | End: 2022-07-26 | Stop reason: SDUPTHER

## 2022-04-09 ENCOUNTER — PATIENT OUTREACH (OUTPATIENT)
Dept: ADMINISTRATIVE | Facility: OTHER | Age: 37
End: 2022-04-09

## 2022-04-09 NOTE — PROGRESS NOTES
Health Maintenance Due   Topic Date Due    Hepatitis C Screening  Never done    COVID-19 Vaccine (1) Never done    TETANUS VACCINE  Never done    Influenza Vaccine (1) Never done     Updates were requested from care everywhere.  Chart was reviewed for overdue [facility]active [facility] (IS[facility]) topics (CRS, Breast Cancer Screening, Eye exam)  Health Maintenance has been updated.  LINKS immunization registry triggered.  Immunizations were reconciled.

## 2022-04-13 DIAGNOSIS — F32.A ANXIETY AND DEPRESSION: ICD-10-CM

## 2022-04-13 DIAGNOSIS — M54.9 MID BACK PAIN: ICD-10-CM

## 2022-04-13 DIAGNOSIS — M54.6 PAIN IN THORACIC SPINE: ICD-10-CM

## 2022-04-13 DIAGNOSIS — F41.9 ANXIETY AND DEPRESSION: ICD-10-CM

## 2022-04-13 DIAGNOSIS — M54.9 DORSALGIA, UNSPECIFIED: ICD-10-CM

## 2022-04-13 DIAGNOSIS — F43.23 ADJUSTMENT REACTION WITH ANXIETY AND DEPRESSION: ICD-10-CM

## 2022-04-13 RX ORDER — CITALOPRAM 40 MG/1
40 TABLET, FILM COATED ORAL DAILY
Qty: 90 TABLET | Refills: 3 | Status: SHIPPED | OUTPATIENT
Start: 2022-04-13 | End: 2022-07-06 | Stop reason: SDUPTHER

## 2022-04-13 RX ORDER — IBUPROFEN 800 MG/1
800 TABLET ORAL 3 TIMES DAILY
Qty: 30 TABLET | Refills: 0 | OUTPATIENT
Start: 2022-04-13

## 2022-04-13 NOTE — TELEPHONE ENCOUNTER
No new care gaps identified.  Powered by [employer] by [employer] Reference number: [id number].   4/13/2022 11:26:26 AM CDT

## 2022-07-07 ENCOUNTER — PATIENT MESSAGE (OUTPATIENT)
Dept: FAMILY MEDICINE | Facility: CLINIC | Age: 37
End: 2022-07-07

## 2022-07-26 ENCOUNTER — OFFICE VISIT (OUTPATIENT)
Dept: FAMILY MEDICINE | Facility: CLINIC | Age: 37
End: 2022-07-26

## 2022-07-26 DIAGNOSIS — F90.0 ATTENTION DEFICIT HYPERACTIVITY DISORDER (ADHD), PREDOMINANTLY INATTENTIVE TYPE: ICD-10-CM

## 2022-07-26 PROCEDURE — 99214 OFFICE O/P EST MOD 30 MIN: CPT | Mod: 95,,, | Performed by: FAMILY MEDICINE

## 2022-07-26 PROCEDURE — 99214 PR OFFICE/OUTPT VISIT, EST, LEVL IV, 30-39 MIN: ICD-10-PCS | Mod: 95,,, | Performed by: FAMILY MEDICINE

## 2022-07-26 RX ORDER — DEXTROAMPHETAMINE SACCHARATE, AMPHETAMINE ASPARTATE, DEXTROAMPHETAMINE SULFATE AND AMPHETAMINE SULFATE 7.5; 7.5; 7.5; 7.5 MG/1; MG/1; MG/1; MG/1
1 TABLET ORAL DAILY
Qty: 30 TABLET | Refills: 0 | Status: SHIPPED | OUTPATIENT
Start: 2022-07-26 | End: 2022-10-11 | Stop reason: SDUPTHER

## 2022-07-26 RX ORDER — DEXTROAMPHETAMINE SACCHARATE, AMPHETAMINE ASPARTATE, DEXTROAMPHETAMINE SULFATE AND AMPHETAMINE SULFATE 7.5; 7.5; 7.5; 7.5 MG/1; MG/1; MG/1; MG/1
30 TABLET ORAL DAILY
Qty: 30 TABLET | Refills: 0 | Status: SHIPPED | OUTPATIENT
Start: 2022-07-26 | End: 2022-12-13 | Stop reason: SDUPTHER

## 2022-07-26 NOTE — PATIENT INSTRUCTIONS
Alexandre Moeller,     If you are due for any health screening(s) below please notify me so we can arrange them to be ordered and scheduled to maintain your health. Most healthy patients complete it. Don't lose out on improving your health.     Tests to Keep You Healthy

## 2022-07-28 NOTE — PROGRESS NOTES
The patient location is:  [place]  The chief complaint leading to consultation is: med refill  Visit type: Virtual visit with synchronous audio and video  Total time spent with patient:  Less than 30 minutes  Each patient to whom he or she provides medical services by telemedicine is:  (1) informed of the relationship between the physician and patient and the respective role of any other health care provider with respect to management of the patient; and (2) notified that he or she may decline to receive medical services by telemedicine and may withdraw from such care at any time. Vital signs recorded were provided by the patient.    Notes:  See below    Subjective:   Patient ID: Sajan Lane is a 36 y.o. male     Chief Complaint: med refill    Here for med refill. Doing well.    Review of Systems   Respiratory: Negative for shortness of breath.    Cardiovascular: Negative for chest pain.   Gastrointestinal: Negative for abdominal pain.   Genitourinary: Negative for dysuria.     Past Medical History:   Diagnosis Date    Dyslexia      Past Surgical History:   Procedure Laterality Date    KNEE SURGERY      reconstruction left knee from birth age 4      Objective:   There were no vitals filed for this visit.  There is no height or weight on file to calculate BMI.  Physical Exam  Vitals and nursing note reviewed.   Constitutional:       Appearance: He is well-developed.   HENT:      Head: Normocephalic and atraumatic.   Eyes:      General: No scleral icterus.     Conjunctiva/sclera: Conjunctivae normal.   Pulmonary:      Effort: Pulmonary effort is normal. No respiratory distress.   Musculoskeletal:         General: No deformity. Normal range of motion.      Cervical back: Normal range of motion and neck supple.   Skin:     Coloration: Skin is not pale.      Findings: No rash.   Neurological:      Mental Status: He is alert and oriented to person, place, and time.   Psychiatric:         Behavior: Behavior  normal.         Thought Content: Thought content normal.         Judgment: Judgment normal.       Assessment:     1. Attention deficit hyperactivity disorder (ADHD), predominantly inattentive type      Plan:   Attention deficit hyperactivity disorder (ADHD), predominantly inattentive type  -     dextroamphetamine-amphetamine 30 mg Tab; Take 1 tablet (30 mg total) by mouth once daily.  Dispense: 30 tablet; Refill: 0  -     dextroamphetamine-amphetamine 30 mg Tab; Take 1 tablet (30 mg total) by mouth once daily. Do not fill until 9/26/22  Dispense: 30 tablet; Refill: 0  -     dextroamphetamine-amphetamine 30 mg Tab; Take 1 tablet (30 mg total) by mouth once daily at 6am. Do not fill until 8/26/22  Dispense: 30 tablet; Refill: 0            Total time spent of Less than 30 minutes minutes on the day of the visit.This includes face to face time and preparing to see the patient, obtaining and reviewing separately obtained history, documenting clinical information in the electronic or other health record, independently interpreting results and communicating results to the patient/family/caregiver, or care coordinator.    Bandar Colmenares MD  07/28/2022    Portions of this note have been dictated with SHAREE Whelan

## 2022-09-07 ENCOUNTER — OFFICE VISIT (OUTPATIENT)
Dept: FAMILY MEDICINE | Facility: CLINIC | Age: 37
End: 2022-09-07

## 2022-09-07 DIAGNOSIS — F41.9 ANXIETY: Primary | ICD-10-CM

## 2022-09-07 PROCEDURE — 99213 OFFICE O/P EST LOW 20 MIN: CPT | Mod: 95,,, | Performed by: FAMILY MEDICINE

## 2022-09-07 PROCEDURE — 99213 PR OFFICE/OUTPT VISIT, EST, LEVL III, 20-29 MIN: ICD-10-PCS | Mod: 95,,, | Performed by: FAMILY MEDICINE

## 2022-09-08 ENCOUNTER — PATIENT MESSAGE (OUTPATIENT)
Dept: FAMILY MEDICINE | Facility: CLINIC | Age: 37
End: 2022-09-08

## 2022-09-08 DIAGNOSIS — Z71.1 CONCERN ABOUT STD IN MALE WITHOUT DIAGNOSIS: Primary | ICD-10-CM

## 2022-09-08 DIAGNOSIS — Z00.00 WELLNESS EXAMINATION: ICD-10-CM

## 2022-09-09 ENCOUNTER — TELEPHONE (OUTPATIENT)
Dept: FAMILY MEDICINE | Facility: CLINIC | Age: 37
End: 2022-09-09

## 2022-09-09 ENCOUNTER — PATIENT MESSAGE (OUTPATIENT)
Dept: FAMILY MEDICINE | Facility: CLINIC | Age: 37
End: 2022-09-09

## 2022-09-15 ENCOUNTER — LAB VISIT (OUTPATIENT)
Dept: LAB | Facility: HOSPITAL | Age: 37
End: 2022-09-15
Attending: FAMILY MEDICINE

## 2022-09-15 ENCOUNTER — PATIENT MESSAGE (OUTPATIENT)
Dept: FAMILY MEDICINE | Facility: CLINIC | Age: 37
End: 2022-09-15

## 2022-09-15 DIAGNOSIS — Z00.00 WELLNESS EXAMINATION: ICD-10-CM

## 2022-09-15 DIAGNOSIS — Z71.1 CONCERN ABOUT STD IN MALE WITHOUT DIAGNOSIS: ICD-10-CM

## 2022-09-15 LAB
ALBUMIN SERPL BCP-MCNC: 4.4 G/DL (ref 3.5–5.2)
ALP SERPL-CCNC: 93 U/L (ref 55–135)
ALT SERPL W/O P-5'-P-CCNC: 24 U/L (ref 10–44)
ANION GAP SERPL CALC-SCNC: 10 MMOL/L (ref 8–16)
AST SERPL-CCNC: 26 U/L (ref 10–40)
BILIRUB SERPL-MCNC: 2.2 MG/DL (ref 0.1–1)
BUN SERPL-MCNC: 18 MG/DL (ref 6–20)
CALCIUM SERPL-MCNC: 9.5 MG/DL (ref 8.7–10.5)
CHLORIDE SERPL-SCNC: 102 MMOL/L (ref 95–110)
CHOLEST SERPL-MCNC: 145 MG/DL (ref 120–199)
CHOLEST/HDLC SERPL: 2.7 {RATIO} (ref 2–5)
CO2 SERPL-SCNC: 27 MMOL/L (ref 23–29)
CREAT SERPL-MCNC: 1.2 MG/DL (ref 0.5–1.4)
EST. GFR  (NO RACE VARIABLE): >60 ML/MIN/1.73 M^2
ESTIMATED AVG GLUCOSE: 85 MG/DL (ref 68–131)
GLUCOSE SERPL-MCNC: 94 MG/DL (ref 70–110)
HBA1C MFR BLD: 4.6 % (ref 4–5.6)
HCV AB SERPL QL IA: NORMAL
HDLC SERPL-MCNC: 54 MG/DL (ref 40–75)
HDLC SERPL: 37.2 % (ref 20–50)
HGB BLD-MCNC: 15.5 G/DL (ref 14–18)
HIV 1+2 AB+HIV1 P24 AG SERPL QL IA: NORMAL
LDLC SERPL CALC-MCNC: 81 MG/DL (ref 63–159)
NONHDLC SERPL-MCNC: 91 MG/DL
PLATELET # BLD AUTO: 205 K/UL (ref 150–450)
PMV BLD AUTO: 10.9 FL (ref 9.2–12.9)
POTASSIUM SERPL-SCNC: 4.7 MMOL/L (ref 3.5–5.1)
PROT SERPL-MCNC: 7.4 G/DL (ref 6–8.4)
SODIUM SERPL-SCNC: 139 MMOL/L (ref 136–145)
TRIGL SERPL-MCNC: 50 MG/DL (ref 30–150)
TSH SERPL DL<=0.005 MIU/L-ACNC: 1.61 UIU/ML (ref 0.4–4)

## 2022-09-15 PROCEDURE — 83036 HEMOGLOBIN GLYCOSYLATED A1C: CPT | Performed by: FAMILY MEDICINE

## 2022-09-15 PROCEDURE — 84443 ASSAY THYROID STIM HORMONE: CPT | Performed by: FAMILY MEDICINE

## 2022-09-15 PROCEDURE — 85049 AUTOMATED PLATELET COUNT: CPT | Performed by: FAMILY MEDICINE

## 2022-09-15 PROCEDURE — 86592 SYPHILIS TEST NON-TREP QUAL: CPT | Performed by: FAMILY MEDICINE

## 2022-09-15 PROCEDURE — 85018 HEMOGLOBIN: CPT | Performed by: FAMILY MEDICINE

## 2022-09-15 PROCEDURE — 86803 HEPATITIS C AB TEST: CPT | Performed by: FAMILY MEDICINE

## 2022-09-15 PROCEDURE — 80061 LIPID PANEL: CPT | Performed by: FAMILY MEDICINE

## 2022-09-15 PROCEDURE — 36415 COLL VENOUS BLD VENIPUNCTURE: CPT | Performed by: FAMILY MEDICINE

## 2022-09-15 PROCEDURE — 87389 HIV-1 AG W/HIV-1&-2 AB AG IA: CPT | Performed by: FAMILY MEDICINE

## 2022-09-15 PROCEDURE — 80053 COMPREHEN METABOLIC PANEL: CPT | Performed by: FAMILY MEDICINE

## 2022-09-16 DIAGNOSIS — R17 ELEVATED BILIRUBIN: Primary | ICD-10-CM

## 2022-09-16 LAB — RPR SER QL: NORMAL

## 2022-09-16 NOTE — PROGRESS NOTES
The patient location is:  [place]  The chief complaint leading to consultation is:  anxiety  Visit type: Virtual visit with synchronous audio and video  Total time spent with patient:  Less than 30 minutes  Each patient to whom he or she provides medical services by telemedicine is:  (1) informed of the relationship between the physician and patient and the respective role of any other health care provider with respect to management of the patient; and (2) notified that he or she may decline to receive medical services by telemedicine and may withdraw from such care at any time. Vital signs recorded were provided by the patient.    Notes:  See below    Subjective:   Patient ID: Sajan Lane is a 36 y.o. male     Chief Complaint: anxiety    See below  Review of Systems   Respiratory:  Negative for shortness of breath.    Cardiovascular:  Negative for chest pain.   Gastrointestinal:  Negative for abdominal pain.   Genitourinary:  Negative for dysuria.   Psychiatric/Behavioral:  The patient is nervous/anxious.    Past Medical History:   Diagnosis Date    Dyslexia      Past Surgical History:   Procedure Laterality Date    KNEE SURGERY      reconstruction left knee from birth age 4      Objective:   There were no vitals filed for this visit.  There is no height or weight on file to calculate BMI.  Physical Exam  Vitals and nursing note reviewed.   Constitutional:       Appearance: He is well-developed.   HENT:      Head: Normocephalic and atraumatic.   Eyes:      General: No scleral icterus.     Conjunctiva/sclera: Conjunctivae normal.   Pulmonary:      Effort: Pulmonary effort is normal. No respiratory distress.   Musculoskeletal:         General: No deformity. Normal range of motion.      Cervical back: Normal range of motion and neck supple.   Skin:     Coloration: Skin is not pale.      Findings: No rash.   Neurological:      Mental Status: He is alert and oriented to person, place, and time.    Psychiatric:         Behavior: Behavior normal.         Thought Content: Thought content normal.         Judgment: Judgment normal.   Assessment:     1. Anxiety      Plan:   Anxiety  Pt notes fluctuating levels of anxiety. Notes last week had elevated levels have since resolved. Denies si/hi. Counseled on coping mechanism including mindfulness breathing exercises. Provided handout and resources for counseling.        Total time spent of Less than 30 minutes minutes on the day of the visit.This includes face to face time and preparing to see the patient, obtaining and reviewing separately obtained history, documenting clinical information in the electronic or other health record, independently interpreting results and communicating results to the patient/family/caregiver, or care coordinator.    Established patient with me has been instructed that must see me at least 1 time yearly for refills of medications. Seeing other providers in this clinic is fine but expectation is to see me yearly.    Bandar Colmenares MD  09/16/2022    Portions of this note have been dictated with SHAREE Whelan

## 2022-09-19 ENCOUNTER — PATIENT MESSAGE (OUTPATIENT)
Dept: FAMILY MEDICINE | Facility: CLINIC | Age: 37
End: 2022-09-19

## 2022-09-20 ENCOUNTER — PATIENT MESSAGE (OUTPATIENT)
Dept: FAMILY MEDICINE | Facility: CLINIC | Age: 37
End: 2022-09-20

## 2022-09-20 DIAGNOSIS — A74.9 CHLAMYDIA: Primary | ICD-10-CM

## 2022-09-20 RX ORDER — DOXYCYCLINE 100 MG/1
100 CAPSULE ORAL EVERY 12 HOURS
Qty: 14 CAPSULE | Refills: 0 | Status: SHIPPED | OUTPATIENT
Start: 2022-09-20 | End: 2022-09-27

## 2022-09-21 ENCOUNTER — PATIENT MESSAGE (OUTPATIENT)
Dept: FAMILY MEDICINE | Facility: CLINIC | Age: 37
End: 2022-09-21

## 2022-10-11 DIAGNOSIS — F90.0 ATTENTION DEFICIT HYPERACTIVITY DISORDER (ADHD), PREDOMINANTLY INATTENTIVE TYPE: ICD-10-CM

## 2022-10-11 NOTE — TELEPHONE ENCOUNTER
No new care gaps identified.  [facility] Embedded Care Gaps. Reference number: [id number]. 10/11/2022   5:14:05 PM CDT

## 2022-10-14 RX ORDER — DEXTROAMPHETAMINE SACCHARATE, AMPHETAMINE ASPARTATE, DEXTROAMPHETAMINE SULFATE AND AMPHETAMINE SULFATE 7.5; 7.5; 7.5; 7.5 MG/1; MG/1; MG/1; MG/1
1 TABLET ORAL DAILY
Qty: 30 TABLET | Refills: 0 | Status: SHIPPED | OUTPATIENT
Start: 2022-10-14 | End: 2022-12-13 | Stop reason: SDUPTHER

## 2022-10-25 ENCOUNTER — PATIENT MESSAGE (OUTPATIENT)
Dept: FAMILY MEDICINE | Facility: CLINIC | Age: 37
End: 2022-10-25

## 2022-10-25 ENCOUNTER — LAB VISIT (OUTPATIENT)
Dept: LAB | Facility: HOSPITAL | Age: 37
End: 2022-10-25
Attending: FAMILY MEDICINE

## 2022-10-25 DIAGNOSIS — R17 ELEVATED BILIRUBIN: ICD-10-CM

## 2022-10-25 DIAGNOSIS — A64 STD (MALE): Primary | ICD-10-CM

## 2022-10-25 LAB — BILIRUB SERPL-MCNC: 1.7 MG/DL (ref 0.1–1)

## 2022-10-25 PROCEDURE — 36415 COLL VENOUS BLD VENIPUNCTURE: CPT | Performed by: FAMILY MEDICINE

## 2022-10-25 PROCEDURE — 82247 BILIRUBIN TOTAL: CPT | Performed by: FAMILY MEDICINE

## 2022-10-26 DIAGNOSIS — R17 ELEVATED BILIRUBIN: Primary | ICD-10-CM

## 2022-10-31 ENCOUNTER — LAB VISIT (OUTPATIENT)
Dept: LAB | Facility: HOSPITAL | Age: 37
End: 2022-10-31
Attending: FAMILY MEDICINE

## 2022-10-31 DIAGNOSIS — A64 STD (MALE): ICD-10-CM

## 2022-10-31 LAB
C TRACH DNA SPEC QL NAA+PROBE: NOT DETECTED
N GONORRHOEA DNA SPEC QL NAA+PROBE: NOT DETECTED

## 2022-10-31 PROCEDURE — 87491 CHLMYD TRACH DNA AMP PROBE: CPT | Performed by: FAMILY MEDICINE

## 2022-10-31 PROCEDURE — 87591 N.GONORRHOEAE DNA AMP PROB: CPT | Performed by: FAMILY MEDICINE

## 2022-11-01 ENCOUNTER — PATIENT MESSAGE (OUTPATIENT)
Dept: FAMILY MEDICINE | Facility: CLINIC | Age: 37
End: 2022-11-01

## 2022-11-07 ENCOUNTER — PATIENT MESSAGE (OUTPATIENT)
Dept: FAMILY MEDICINE | Facility: CLINIC | Age: 37
End: 2022-11-07

## 2022-11-07 ENCOUNTER — HOSPITAL ENCOUNTER (OUTPATIENT)
Dept: RADIOLOGY | Facility: HOSPITAL | Age: 37
Discharge: HOME | End: 2022-11-07
Attending: FAMILY MEDICINE

## 2022-11-07 DIAGNOSIS — K76.0 FATTY LIVER: Primary | ICD-10-CM

## 2022-11-07 DIAGNOSIS — R17 ELEVATED BILIRUBIN: ICD-10-CM

## 2022-11-07 PROCEDURE — 76700 US EXAM ABDOM COMPLETE: CPT | Mod: TC

## 2022-11-07 PROCEDURE — 76700 US ABDOMEN COMPLETE: ICD-10-PCS | Mod: 26,,, | Performed by: RADIOLOGY

## 2022-11-07 PROCEDURE — 76700 US EXAM ABDOM COMPLETE: CPT | Mod: 26,,, | Performed by: RADIOLOGY

## 2022-11-08 NOTE — TELEPHONE ENCOUNTER
Patient scheduled for labs in 3 months. Also advised of recommendations. Patient verbalized understanding.

## 2022-11-22 ENCOUNTER — PATIENT MESSAGE (OUTPATIENT)
Dept: FAMILY MEDICINE | Facility: CLINIC | Age: 37
End: 2022-11-22

## 2022-12-13 ENCOUNTER — OFFICE VISIT (OUTPATIENT)
Dept: FAMILY MEDICINE | Facility: CLINIC | Age: 37
End: 2022-12-13

## 2022-12-13 DIAGNOSIS — F90.0 ATTENTION DEFICIT HYPERACTIVITY DISORDER (ADHD), PREDOMINANTLY INATTENTIVE TYPE: ICD-10-CM

## 2022-12-13 DIAGNOSIS — B00.9 HSV-1 (HERPES SIMPLEX VIRUS 1) INFECTION: Primary | ICD-10-CM

## 2022-12-13 PROCEDURE — 99214 PR OFFICE/OUTPT VISIT, EST, LEVL IV, 30-39 MIN: ICD-10-PCS | Mod: 95,,, | Performed by: FAMILY MEDICINE

## 2022-12-13 PROCEDURE — 99214 OFFICE O/P EST MOD 30 MIN: CPT | Mod: 95,,, | Performed by: FAMILY MEDICINE

## 2022-12-13 RX ORDER — DEXTROAMPHETAMINE SACCHARATE, AMPHETAMINE ASPARTATE, DEXTROAMPHETAMINE SULFATE AND AMPHETAMINE SULFATE 7.5; 7.5; 7.5; 7.5 MG/1; MG/1; MG/1; MG/1
1 TABLET ORAL DAILY
Qty: 30 TABLET | Refills: 0 | Status: SHIPPED | OUTPATIENT
Start: 2022-12-13 | End: 2023-09-04 | Stop reason: SDUPTHER

## 2022-12-13 RX ORDER — VALACYCLOVIR HYDROCHLORIDE 1 G/1
1000 TABLET, FILM COATED ORAL 2 TIMES DAILY
Qty: 14 TABLET | Refills: 1 | Status: SHIPPED | OUTPATIENT
Start: 2022-12-13 | End: 2023-02-07 | Stop reason: SDUPTHER

## 2022-12-13 RX ORDER — DEXTROAMPHETAMINE SACCHARATE, AMPHETAMINE ASPARTATE, DEXTROAMPHETAMINE SULFATE AND AMPHETAMINE SULFATE 7.5; 7.5; 7.5; 7.5 MG/1; MG/1; MG/1; MG/1
30 TABLET ORAL DAILY
Qty: 30 TABLET | Refills: 0 | Status: SHIPPED | OUTPATIENT
Start: 2022-12-13 | End: 2023-09-04 | Stop reason: SDUPTHER

## 2022-12-13 RX ORDER — DEXTROAMPHETAMINE SACCHARATE, AMPHETAMINE ASPARTATE, DEXTROAMPHETAMINE SULFATE AND AMPHETAMINE SULFATE 7.5; 7.5; 7.5; 7.5 MG/1; MG/1; MG/1; MG/1
1 TABLET ORAL DAILY
Qty: 30 TABLET | Refills: 0 | Status: SHIPPED | OUTPATIENT
Start: 2022-12-13 | End: 2023-06-23 | Stop reason: SDUPTHER

## 2022-12-15 NOTE — PROGRESS NOTES
The patient location is:  [place]  The chief complaint leading to consultation is: checkup  Visit type: Virtual visit with synchronous audio and video  Total time spent with patient:  Less than 30 minutes  Each patient to whom he or she provides medical services by telemedicine is:  (1) informed of the relationship between the physician and patient and the respective role of any other health care provider with respect to management of the patient; and (2) notified that he or she may decline to receive medical services by telemedicine and may withdraw from such care at any time. Vital signs recorded were provided by the patient.    Notes:  See below    Subjective:   Patient ID: Sajan Lane is a 37 y.o. male     Chief Complaint: checkup    Here for checkup and med refill    Review of Systems   Respiratory:  Negative for shortness of breath.    Cardiovascular:  Negative for chest pain.   Gastrointestinal:  Negative for abdominal pain.   Genitourinary:  Negative for dysuria.   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  There is no height or weight on file to calculate BMI.  Physical Exam  Vitals and nursing note reviewed.   Constitutional:       Appearance: He is well-developed.   HENT:      Head: Normocephalic and atraumatic.   Eyes:      General: No scleral icterus.     Conjunctiva/sclera: Conjunctivae normal.   Pulmonary:      Effort: Pulmonary effort is normal. No respiratory distress.   Musculoskeletal:         General: No deformity. Normal range of motion.      Cervical back: Normal range of motion and neck supple.   Skin:     Coloration: Skin is not pale.      Findings: No rash.   Neurological:      Mental Status: He is alert and oriented to person, place, and time.   Psychiatric:         Behavior: Behavior normal.          Thought Content: Thought content normal.         Judgment: Judgment normal.     Assessment:     1. HSV-1 (herpes simplex virus 1) infection    2. Attention deficit hyperactivity disorder (ADHD), predominantly inattentive type      Plan:   HSV-1 (herpes simplex virus 1) infection  -     valACYclovir (VALTREX) 1000 MG tablet; Take 1 tablet (1,000 mg total) by mouth 2 (two) times daily. for 7 days  Dispense: 14 tablet; Refill: 1    Attention deficit hyperactivity disorder (ADHD), predominantly inattentive type  -     dextroamphetamine-amphetamine 30 mg Tab; Take 1 tablet (30 mg total) by mouth once daily. Do not fill until 2/13/2023  Dispense: 30 tablet; Refill: 0  -     dextroamphetamine-amphetamine 30 mg Tab; Take 1 tablet (30 mg total) by mouth once daily. Do not fill until 1/13/2023  Dispense: 30 tablet; Refill: 0  -     dextroamphetamine-amphetamine 30 mg Tab; Take 1 tablet (30 mg total) by mouth once daily.  Dispense: 30 tablet; Refill: 0            Total time spent of Less than 30 minutes minutes on the day of the visit.This includes face to face time and preparing to see the patient, obtaining and reviewing separately obtained history, documenting clinical information in the electronic or other health record, independently interpreting results and communicating results to the patient/family/caregiver, or care coordinator.    Established patient with me has been instructed that must see me at least 1 time yearly (every 365 days) for refills of medications. Seeing other providers in this clinic is fine but expectation is to see me yearly.    Bandar Colmenares MD  12/15/2022    Portions of this note have been dictated with SHAREE Whelan

## 2023-01-25 ENCOUNTER — OFFICE VISIT (OUTPATIENT)
Dept: URGENT CARE | Facility: CLINIC | Age: 38
End: 2023-01-25

## 2023-01-25 VITALS
OXYGEN SATURATION: 97 % | WEIGHT: 220 LBS | TEMPERATURE: 99 F | HEART RATE: 96 BPM | SYSTOLIC BLOOD PRESSURE: 132 MMHG | HEIGHT: 71 IN | DIASTOLIC BLOOD PRESSURE: 80 MMHG | BODY MASS INDEX: 30.8 KG/M2

## 2023-01-25 DIAGNOSIS — J02.9 SORE THROAT: ICD-10-CM

## 2023-01-25 DIAGNOSIS — J02.9 PHARYNGITIS, UNSPECIFIED ETIOLOGY: Primary | ICD-10-CM

## 2023-01-25 LAB
CTP QC/QA: YES
SARS-COV-2 AG RESP QL IA.RAPID: NEGATIVE

## 2023-01-25 PROCEDURE — 87811 SARS CORONAVIRUS 2 ANTIGEN POCT, MANUAL READ: ICD-10-PCS | Mod: QW,S$GLB,, | Performed by: NURSE PRACTITIONER

## 2023-01-25 PROCEDURE — 99202 PR OFFICE/OUTPT VISIT, NEW, LEVL II, 15-29 MIN: ICD-10-PCS | Mod: S$GLB,,, | Performed by: NURSE PRACTITIONER

## 2023-01-25 PROCEDURE — 87811 SARS-COV-2 COVID19 W/OPTIC: CPT | Mod: QW,S$GLB,, | Performed by: NURSE PRACTITIONER

## 2023-01-25 PROCEDURE — 99202 OFFICE O/P NEW SF 15 MIN: CPT | Mod: S$GLB,,, | Performed by: NURSE PRACTITIONER

## 2023-01-25 RX ORDER — AMOXICILLIN 500 MG/1
500 CAPSULE ORAL EVERY 8 HOURS
Qty: 30 CAPSULE | Refills: 0 | Status: SHIPPED | OUTPATIENT
Start: 2023-01-25 | End: 2023-02-04

## 2023-01-25 NOTE — PROGRESS NOTES
"Subjective:       Patient ID: Sajan Lane is a 37 y.o. male.    Vitals:  height is 5' 11" (1.803 m) and weight is 99.8 kg (220 lb). His oral temperature is 98.5 °F (36.9 °C). His blood pressure is 132/80 and his pulse is 96. His oxygen saturation is 97%.     Chief Complaint: Sore Throat    This is a 37 y.o. male who presents today with a chief complaint of sore throat for couple of days.  Patient has had exposure to COVID  Patient presents with:  Sore Throat        Sore Throat   This is a new problem. The current episode started in the past 7 days. The problem has been gradually worsening. Neither side of throat is experiencing more pain than the other. There has been no fever. The patient is experiencing no pain. Associated symptoms include congestion, headaches and neck pain.     HENT:  Positive for congestion and sore throat.    Neck: Positive for neck pain.   Neurological:  Positive for headaches.         Objective:      Physical Exam   Constitutional: obesity  HENT:   Head: Normocephalic and atraumatic.   Ears:   Right Ear: Tympanic membrane, external ear and ear canal normal.   Left Ear: Tympanic membrane, external ear and ear canal normal.   Nose: Nose normal.   Mouth/Throat: Mucous membranes are moist. Posterior oropharyngeal erythema present. Oropharynx is clear.   Eyes: Conjunctivae are normal. Pupils are equal, round, and reactive to light. Extraocular movement intact   Neck: Neck supple.   Cardiovascular: Normal rate, regular rhythm, normal heart sounds and normal pulses.   Pulmonary/Chest: Effort normal and breath sounds normal.   Abdominal: Normal appearance.   Musculoskeletal: Normal range of motion.         General: Normal range of motion.   Neurological: He is alert.   Skin: Skin is warm and dry.   Psychiatric: His behavior is normal. Mood normal.   Vitals reviewed.      Past medical history and current medications reviewed.       Assessment:           1. Pharyngitis, unspecified etiology  "   2. Sore throat              Plan:         Pharyngitis, unspecified etiology    Sore throat  -     SARS Coronavirus 2 Antigen, POCT Manual Read             There are no Patient Instructions on file for this visit.

## 2023-03-06 ENCOUNTER — PATIENT MESSAGE (OUTPATIENT)
Dept: FAMILY MEDICINE | Facility: CLINIC | Age: 38
End: 2023-03-06

## 2023-03-06 DIAGNOSIS — F90.0 ATTENTION DEFICIT HYPERACTIVITY DISORDER (ADHD), PREDOMINANTLY INATTENTIVE TYPE: Primary | ICD-10-CM

## 2023-03-06 RX ORDER — DEXTROAMPHETAMINE SACCHARATE, AMPHETAMINE ASPARTATE, DEXTROAMPHETAMINE SULFATE AND AMPHETAMINE SULFATE 2.5; 2.5; 2.5; 2.5 MG/1; MG/1; MG/1; MG/1
30 TABLET ORAL DAILY
Qty: 90 TABLET | Refills: 0 | Status: SHIPPED | OUTPATIENT
Start: 2023-03-06 | End: 2023-04-05

## 2023-03-14 ENCOUNTER — PATIENT MESSAGE (OUTPATIENT)
Dept: FAMILY MEDICINE | Facility: CLINIC | Age: 38
End: 2023-03-14

## 2023-04-19 ENCOUNTER — OFFICE VISIT (OUTPATIENT)
Dept: FAMILY MEDICINE | Facility: CLINIC | Age: 38
End: 2023-04-19

## 2023-04-19 ENCOUNTER — PATIENT MESSAGE (OUTPATIENT)
Dept: FAMILY MEDICINE | Facility: CLINIC | Age: 38
End: 2023-04-19

## 2023-04-19 ENCOUNTER — TELEPHONE (OUTPATIENT)
Dept: FAMILY MEDICINE | Facility: CLINIC | Age: 38
End: 2023-04-19

## 2023-04-19 DIAGNOSIS — R30.0 DYSURIA: Primary | ICD-10-CM

## 2023-04-19 DIAGNOSIS — Z11.3 SCREEN FOR STD (SEXUALLY TRANSMITTED DISEASE): ICD-10-CM

## 2023-04-19 PROCEDURE — 99213 OFFICE O/P EST LOW 20 MIN: CPT | Mod: 95,,, | Performed by: PHYSICIAN ASSISTANT

## 2023-04-19 PROCEDURE — 99213 PR OFFICE/OUTPT VISIT, EST, LEVL III, 20-29 MIN: ICD-10-PCS | Mod: 95,,, | Performed by: PHYSICIAN ASSISTANT
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
## 2023-04-19 NOTE — TELEPHONE ENCOUNTER
----- Message from KESHAV Samuels sent at 4/19/2023 11:20 AM CDT -----  Labs ordered call to schedule

## 2023-04-20 ENCOUNTER — LAB VISIT (OUTPATIENT)
Dept: LAB | Facility: HOSPITAL | Age: 38
End: 2023-04-20
Attending: PHYSICIAN ASSISTANT

## 2023-04-20 DIAGNOSIS — R30.0 DYSURIA: ICD-10-CM

## 2023-04-20 DIAGNOSIS — Z11.3 SCREEN FOR STD (SEXUALLY TRANSMITTED DISEASE): ICD-10-CM

## 2023-04-20 LAB
BILIRUB UR QL STRIP: NEGATIVE
CLARITY UR: CLEAR
COLOR UR: YELLOW
GLUCOSE UR QL STRIP: NEGATIVE
HCV AB SERPL QL IA: NORMAL
HGB UR QL STRIP: NEGATIVE
HIV 1+2 AB+HIV1 P24 AG SERPL QL IA: NORMAL
KETONES UR QL STRIP: NEGATIVE
LEUKOCYTE ESTERASE UR QL STRIP: NEGATIVE
NITRITE UR QL STRIP: NEGATIVE
PH UR STRIP: 8 [PH] (ref 5–8)
PROT UR QL STRIP: NEGATIVE
SP GR UR STRIP: 1.02 (ref 1–1.03)
URN SPEC COLLECT METH UR: NORMAL
UROBILINOGEN UR STRIP-ACNC: NEGATIVE EU/DL

## 2023-04-20 PROCEDURE — 86592 SYPHILIS TEST NON-TREP QUAL: CPT | Performed by: PHYSICIAN ASSISTANT

## 2023-04-20 PROCEDURE — 36415 COLL VENOUS BLD VENIPUNCTURE: CPT | Performed by: PHYSICIAN ASSISTANT

## 2023-04-20 PROCEDURE — 87661 TRICHOMONAS VAGINALIS AMPLIF: CPT | Performed by: PHYSICIAN ASSISTANT

## 2023-04-20 PROCEDURE — 86803 HEPATITIS C AB TEST: CPT | Performed by: PHYSICIAN ASSISTANT

## 2023-04-20 PROCEDURE — 87389 HIV-1 AG W/HIV-1&-2 AB AG IA: CPT | Performed by: PHYSICIAN ASSISTANT

## 2023-04-20 PROCEDURE — 81003 URINALYSIS AUTO W/O SCOPE: CPT | Performed by: PHYSICIAN ASSISTANT

## 2023-04-21 LAB — RPR SER QL: NORMAL

## 2023-04-24 LAB
T VAGINALIS RRNA SPEC QL NAA+PROBE: NOT DETECTED
TRICHOMONAS VAGINALIS RNA, QUAL, SOURCE: NORMAL
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
## 2023-05-03 DIAGNOSIS — B00.9 HSV-1 (HERPES SIMPLEX VIRUS 1) INFECTION: ICD-10-CM

## 2023-05-03 NOTE — TELEPHONE ENCOUNTER
Care Due:                  Date            Visit Type   Department     Provider  --------------------------------------------------------------------------------                                ESTABLISHED                              PATIENT -    [facility]  Last Visit: 12-      [email or web address]      MEDICINE       Banadr Colmenares  Next Visit: None Scheduled  None         None Found                                                            Last  Test          Frequency    Reason                     Performed    Due Date  --------------------------------------------------------------------------------    CBC.........  12 months..  valACYclovir.............  Not Found    Overdue    [employer] Embedded Care Due Messages. Reference number: [id number].   5/03/2023 5:52:21 PM CDT

## 2023-05-05 RX ORDER — METHOCARBAMOL 500 MG/1
500 TABLET, FILM COATED ORAL 4 TIMES DAILY PRN
Qty: 90 TABLET | Refills: 1 | Status: SHIPPED | OUTPATIENT
Start: 2023-05-05 | End: 2023-06-23 | Stop reason: SDUPTHER

## 2023-05-05 RX ORDER — VALACYCLOVIR HYDROCHLORIDE 1 G/1
1000 TABLET, FILM COATED ORAL 2 TIMES DAILY
Qty: 14 TABLET | Refills: 1 | Status: SHIPPED | OUTPATIENT
Start: 2023-05-05 | End: 2023-06-23 | Stop reason: SDUPTHER
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
## 2023-06-23 DIAGNOSIS — F90.0 ATTENTION DEFICIT HYPERACTIVITY DISORDER (ADHD), PREDOMINANTLY INATTENTIVE TYPE: ICD-10-CM

## 2023-06-23 DIAGNOSIS — B00.9 HSV-1 (HERPES SIMPLEX VIRUS 1) INFECTION: ICD-10-CM

## 2023-06-23 RX ORDER — DEXTROAMPHETAMINE SACCHARATE, AMPHETAMINE ASPARTATE, DEXTROAMPHETAMINE SULFATE AND AMPHETAMINE SULFATE 7.5; 7.5; 7.5; 7.5 MG/1; MG/1; MG/1; MG/1
1 TABLET ORAL DAILY
Qty: 30 TABLET | Refills: 0 | Status: SHIPPED | OUTPATIENT
Start: 2023-06-23 | End: 2023-09-08 | Stop reason: SDUPTHER

## 2023-06-23 RX ORDER — VALACYCLOVIR HYDROCHLORIDE 1 G/1
1000 TABLET, FILM COATED ORAL 2 TIMES DAILY
Qty: 14 TABLET | Refills: 1 | Status: SHIPPED | OUTPATIENT
Start: 2023-06-23 | End: 2023-09-04 | Stop reason: ALTCHOICE

## 2023-06-23 RX ORDER — METHOCARBAMOL 500 MG/1
500 TABLET, FILM COATED ORAL 4 TIMES DAILY PRN
Qty: 90 TABLET | Refills: 1 | Status: SHIPPED | OUTPATIENT
Start: 2023-06-23 | End: 2023-08-21 | Stop reason: ALTCHOICE

## 2023-06-23 NOTE — TELEPHONE ENCOUNTER
No care due was identified.  [facility] Embedded Care Due Messages. Reference number: [id number].   6/23/2023 6:56:54 AM CDT

## 2023-06-23 NOTE — TELEPHONE ENCOUNTER
Care Due:                  Date            Visit Type   Department     Provider  --------------------------------------------------------------------------------                                ESTABLISHED                              PATIENT -    [facility]  Last Visit: 12-      [employer]      MEDICINE       Bandar Colmenares  Next Visit: None Scheduled  None         None Found                                                            Last  Test          Frequency    Reason                     Performed    Due Date  --------------------------------------------------------------------------------    Cr..........  12 months..  valACYclovir.............  09-   09-    [facility] Embedded Care Due Messages. Reference number: [id number].   6/23/2023 6:56:34 AM CDT

## 2023-08-21 ENCOUNTER — OFFICE VISIT (OUTPATIENT)
Dept: URGENT CARE | Facility: CLINIC | Age: 38
End: 2023-08-21

## 2023-08-21 VITALS
SYSTOLIC BLOOD PRESSURE: 132 MMHG | DIASTOLIC BLOOD PRESSURE: 78 MMHG | WEIGHT: 220 LBS | BODY MASS INDEX: 30.8 KG/M2 | HEART RATE: 77 BPM | HEIGHT: 71 IN | TEMPERATURE: 99 F | OXYGEN SATURATION: 98 %

## 2023-08-21 DIAGNOSIS — J02.9 SORE THROAT: ICD-10-CM

## 2023-08-21 DIAGNOSIS — H92.02 LEFT EAR PAIN: Primary | ICD-10-CM

## 2023-08-21 DIAGNOSIS — H60.92 OTITIS EXTERNA OF LEFT EAR, UNSPECIFIED CHRONICITY, UNSPECIFIED TYPE: ICD-10-CM

## 2023-08-21 PROCEDURE — 99213 OFFICE O/P EST LOW 20 MIN: CPT | Mod: S$GLB,,,

## 2023-08-21 PROCEDURE — 99213 PR OFFICE/OUTPT VISIT, EST, LEVL III, 20-29 MIN: ICD-10-PCS | Mod: S$GLB,,,

## 2023-08-21 RX ORDER — CIPROFLOXACIN AND DEXAMETHASONE 3; 1 MG/ML; MG/ML
4 SUSPENSION/ DROPS AURICULAR (OTIC) 2 TIMES DAILY
Qty: 7.5 ML | Refills: 0 | Status: SHIPPED | OUTPATIENT
Start: 2023-08-21 | End: 2023-09-04 | Stop reason: ALTCHOICE

## 2023-08-21 NOTE — PATIENT INSTRUCTIONS
You must understand that you've received an Urgent Care treatment only and that you may be released before all your medical problems are known or treated. You, the patient, will arrange for follow up care as instructed.  Follow up with your PCP or specialty clinic as directed in the next 1-2 weeks if not improved or as needed.  You can call [phone] to schedule an appointment with the appropriate provider.  If your condition worsens we recommend that you receive another evaluation at the emergency room immediately or contact your primary medical clinics after hours call service to discuss your concerns.  Please return here or go to the Emergency Department for any concerns or worsening of condition.  Please if you smoke please consider quitting. [facility] hotline number is [phone], available at this number is free counseling and medications to live a healthier life!         If you were prescribed a narcotic or controlled medication, do not drive or operate heavy equipment or machinery while taking these medications.    Propranolol Counseling:  I discussed with the patient the risks of propranolol including but not limited to low heart rate, low blood pressure, low blood sugar, restlessness and increased cold sensitivity. They should call the office if they experience any of these side effects.
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
## 2023-09-04 ENCOUNTER — CLINICAL SUPPORT (OUTPATIENT)
Dept: URGENT CARE | Facility: CLINIC | Age: 38
End: 2023-09-04

## 2023-09-04 VITALS
SYSTOLIC BLOOD PRESSURE: 129 MMHG | HEART RATE: 66 BPM | DIASTOLIC BLOOD PRESSURE: 72 MMHG | WEIGHT: 220 LBS | HEIGHT: 71 IN | TEMPERATURE: 98 F | BODY MASS INDEX: 30.8 KG/M2 | OXYGEN SATURATION: 97 %

## 2023-09-04 DIAGNOSIS — R09.81 NASAL CONGESTION: ICD-10-CM

## 2023-09-04 DIAGNOSIS — B96.89 ACUTE BACTERIAL SINUSITIS: Primary | ICD-10-CM

## 2023-09-04 DIAGNOSIS — J01.90 ACUTE BACTERIAL SINUSITIS: Primary | ICD-10-CM

## 2023-09-04 PROCEDURE — 99214 OFFICE O/P EST MOD 30 MIN: CPT | Mod: TIER,S$GLB,, | Performed by: NURSE PRACTITIONER

## 2023-09-04 PROCEDURE — 99214 PR OFFICE/OUTPT VISIT, EST, LEVL IV, 30-39 MIN: ICD-10-PCS | Mod: TIER,S$GLB,, | Performed by: NURSE PRACTITIONER

## 2023-09-04 RX ORDER — AZITHROMYCIN 250 MG/1
TABLET, FILM COATED ORAL
Qty: 6 TABLET | Refills: 0 | Status: SHIPPED | OUTPATIENT
Start: 2023-09-04

## 2023-09-04 RX ORDER — PREDNISONE 20 MG/1
TABLET ORAL
Qty: 7 TABLET | Refills: 0 | Status: SHIPPED | OUTPATIENT
Start: 2023-09-04 | End: 2023-09-09

## 2023-09-04 RX ORDER — CETIRIZINE HYDROCHLORIDE, PSEUDOEPHEDRINE HYDROCHLORIDE 5; 120 MG/1; MG/1
1 TABLET, FILM COATED, EXTENDED RELEASE ORAL EVERY 12 HOURS PRN
Qty: 18 TABLET | Refills: 0 | Status: SHIPPED | OUTPATIENT
Start: 2023-09-04

## 2023-09-04 NOTE — PROGRESS NOTES
"Subjective:       Patient ID: Sajan Lane is a 37 y.o. male.    Vitals:  height is 5' 11" (1.803 m) and weight is 99.8 kg (220 lb). His oral temperature is 98.3 °F (36.8 °C). His blood pressure is 129/72 and his pulse is 66. His oxygen saturation is 97%.     Chief Complaint: Sinus Problem (Sinus congestion.  Was seen 8/21/23 and given ear drops, now left ear feels muffled.  Benadryl helps but it makes him too drowsy. )    This is a 37 y.o. male who presents today with a chief complaint of Sinus congestion.        Patient presents with:  Sinus Problem: Patient experiencing nasal congestion with nasal discharge, sinus pain, headache, sore throat, cough, and overall not feeling well that started approximately two weeks ago but has worsened over the past few days with mild improvement with OTC medication but not resolution.       Sinus Problem  This is a new problem. The current episode started 1 to 4 weeks ago. The problem has been gradually improving since onset. There has been no fever. His pain is at a severity of 0/10. He is experiencing no pain. Associated symptoms include congestion and sinus pressure. Treatments tried: ciprodex ear drops. The treatment provided mild relief.       Constitution: Negative.   HENT:  Positive for congestion, sinus pain and sinus pressure.    Respiratory: Negative.     Musculoskeletal: Negative.    Neurological:  Negative for disorientation and altered mental status.   Psychiatric/Behavioral:  Negative for altered mental status, disorientation and confusion.            Objective:      Physical Exam   Constitutional: He is oriented to person, place, and time. He appears well-developed. He is cooperative.  Non-toxic appearance. He does not appear ill. No distress.   HENT:   Head: Normocephalic and atraumatic.   Ears:   Right Ear: Hearing, tympanic membrane, external ear and ear canal normal.   Left Ear: Hearing, tympanic membrane, external ear and ear canal normal.   Nose: No " mucosal edema, rhinorrhea or nasal deformity. No epistaxis. Right sinus exhibits maxillary sinus tenderness. Right sinus exhibits no frontal sinus tenderness. Left sinus exhibits maxillary sinus tenderness. Left sinus exhibits no frontal sinus tenderness.   Mouth/Throat: Uvula is midline and mucous membranes are normal. No trismus in the jaw. Normal dentition. No uvula swelling. Posterior oropharyngeal erythema present. No oropharyngeal exudate or posterior oropharyngeal edema.   Eyes: Conjunctivae and lids are normal. No scleral icterus.   Neck: Trachea normal and phonation normal. Neck supple. No edema present. No erythema present. No neck rigidity present.   Cardiovascular: Normal rate, regular rhythm, normal heart sounds and normal pulses.   Pulmonary/Chest: Effort normal and breath sounds normal. No respiratory distress. He has no decreased breath sounds. He has no rhonchi.   Abdominal: Normal appearance.   Musculoskeletal: Normal range of motion.         General: No deformity. Normal range of motion.   Neurological: He is alert and oriented to person, place, and time. He exhibits normal muscle tone. Coordination normal.   Skin: Skin is warm, dry, intact, not diaphoretic and not pale.   Psychiatric: His speech is normal and behavior is normal. Judgment and thought content normal.   Nursing note and vitals reviewed.        Past medical history and current medications reviewed.       Assessment:           1. Acute bacterial sinusitis    2. Nasal congestion              Plan:         Acute bacterial sinusitis  -     azithromycin (Z-SCOOTER) 250 MG tablet; Take 2 tablets by mouth on day 1; Then Take 1 tablet by mouth on days 2-5  Dispense: 6 tablet; Refill: 0    Nasal congestion  -     predniSONE (DELTASONE) 20 MG tablet; Take 2 tablets (40 mg total) by mouth once daily for 2 days, THEN 1 tablet (20 mg total) once daily for 3 days.  Dispense: 7 tablet; Refill: 0  -     cetirizine-pseudoephedrine 5-120 mg Tb12; Take 1  tablet by mouth every 12 (twelve) hours as needed (nasal congestion).  Dispense: 18 tablet; Refill: 0             Patient Instructions   Please return here or go to the Emergency Department for any concerns or worsening of condition.  Please drink plenty of fluids.  Please get plenty of rest.  If you were prescribed antibiotics, please take them to completion.  If you were given wait & see antibiotics, please wait 5-7 days before taking them, and only take them if your symptoms have worsened or not improved.  If you do begin taking the antibiotics, please take them to completion.  If you were given a steroid shot in the clinic and have also been given a prescription for a steroid such as Prednisone or a Medrol Dose Pack, please begin taking them tomorrow.  If you do not have Hypertension or any history of palpitations, it is ok to take over the counter Sudafed or Mucinex D or Allegra-D or Claritin-D or Zyrtec-D.  If you do take one of the above, it is ok to combine that with plain over the counter Mucinex or Allegra or Claritin or Zyrtec.  If for example you are taking Zyrtec -D, you can combine that with Mucinex, but not Mucinex-D.  If you are taking Mucinex-D, you can combine that with plain Allegra or Claritin or Zyrtec.   If you do have Hypertension or palpitations, it is safe to take Coricidin HBP for relief of sinus symptoms.  If not allergic, please take over the counter Tylenol (Acetaminophen) and/or Motrin (Ibuprofen) as directed for control of pain and/or fever.  Please follow up with your primary care doctor or specialist as needed.    If you  smoke, please stop smoking.         Beau Dubon, PRITESHC

## 2023-09-05 DIAGNOSIS — F90.0 ATTENTION DEFICIT HYPERACTIVITY DISORDER (ADHD), PREDOMINANTLY INATTENTIVE TYPE: ICD-10-CM

## 2023-09-05 DIAGNOSIS — F32.A ANXIETY AND DEPRESSION: ICD-10-CM

## 2023-09-05 DIAGNOSIS — F43.23 ADJUSTMENT REACTION WITH ANXIETY AND DEPRESSION: ICD-10-CM

## 2023-09-05 DIAGNOSIS — F41.9 ANXIETY AND DEPRESSION: ICD-10-CM

## 2023-09-05 RX ORDER — CITALOPRAM 40 MG/1
40 TABLET, FILM COATED ORAL DAILY
Qty: 90 TABLET | Refills: 1 | Status: SHIPPED | OUTPATIENT
Start: 2023-09-05 | End: 2024-03-25

## 2023-09-05 RX ORDER — DEXTROAMPHETAMINE SACCHARATE, AMPHETAMINE ASPARTATE, DEXTROAMPHETAMINE SULFATE AND AMPHETAMINE SULFATE 7.5; 7.5; 7.5; 7.5 MG/1; MG/1; MG/1; MG/1
1 TABLET ORAL DAILY
Qty: 30 TABLET | Refills: 0 | Status: CANCELLED | OUTPATIENT
Start: 2023-09-05

## 2023-09-05 NOTE — TELEPHONE ENCOUNTER
No care due was identified.  [facility] Embedded Care Due Messages. Reference number: [id number].   9/05/2023 9:07:48 AM CDT

## 2023-09-06 ENCOUNTER — PATIENT MESSAGE (OUTPATIENT)
Dept: FAMILY MEDICINE | Facility: CLINIC | Age: 38
End: 2023-09-06

## 2023-09-08 ENCOUNTER — OFFICE VISIT (OUTPATIENT)
Dept: FAMILY MEDICINE | Facility: CLINIC | Age: 38
End: 2023-09-08

## 2023-09-08 DIAGNOSIS — F90.0 ATTENTION DEFICIT HYPERACTIVITY DISORDER (ADHD), PREDOMINANTLY INATTENTIVE TYPE: ICD-10-CM

## 2023-09-08 PROCEDURE — 99214 OFFICE O/P EST MOD 30 MIN: CPT | Mod: 95,,, | Performed by: FAMILY MEDICINE

## 2023-09-08 PROCEDURE — 99214 PR OFFICE/OUTPT VISIT, EST, LEVL IV, 30-39 MIN: ICD-10-PCS | Mod: 95,,, | Performed by: FAMILY MEDICINE

## 2023-09-08 RX ORDER — DEXTROAMPHETAMINE SACCHARATE, AMPHETAMINE ASPARTATE, DEXTROAMPHETAMINE SULFATE AND AMPHETAMINE SULFATE 7.5; 7.5; 7.5; 7.5 MG/1; MG/1; MG/1; MG/1
30 TABLET ORAL EVERY MORNING
Qty: 30 TABLET | Refills: 0 | Status: SHIPPED | OUTPATIENT
Start: 2023-09-08 | End: 2024-03-25 | Stop reason: SDUPTHER

## 2023-09-08 RX ORDER — DEXTROAMPHETAMINE SACCHARATE, AMPHETAMINE ASPARTATE, DEXTROAMPHETAMINE SULFATE AND AMPHETAMINE SULFATE 7.5; 7.5; 7.5; 7.5 MG/1; MG/1; MG/1; MG/1
1 TABLET ORAL DAILY
Qty: 30 TABLET | Refills: 0 | Status: SHIPPED | OUTPATIENT
Start: 2023-09-08 | End: 2023-11-16 | Stop reason: SDUPTHER

## 2023-09-08 NOTE — PATIENT INSTRUCTIONS
Alexandre Moeller,     If you are due for any health screening(s) below please notify me so we can arrange them to be ordered and scheduled to maintain your health. Most healthy patients complete it. Don't lose out on improving your health.     All of your core healthy metrics are met.

## 2023-09-08 NOTE — PROGRESS NOTES
The patient location is:  [place]  The chief complaint leading to consultation is:  Checkup  Visit type: Virtual visit with synchronous audio and video  Total time spent with patient:  Less than 30 minutes  Each patient to whom he or she provides medical services by telemedicine is:  (1) informed of the relationship between the physician and patient and the respective role of any other health care provider with respect to management of the patient; and (2) notified that he or she may decline to receive medical services by telemedicine and may withdraw from such care at any time. Vital signs recorded were provided by the patient.    Notes:  See below    Subjective:   Patient ID: Sajan Lane is a 37 y.o. male     Chief Complaint: Checkup    Here for checkup and med refill      Review of Systems   Respiratory:  Negative for shortness of breath.    Cardiovascular:  Negative for chest pain.   Gastrointestinal:  Negative for abdominal pain.   Genitourinary:  Negative for dysuria.     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  There is no height or weight on file to calculate BMI.  Physical Exam  Vitals and nursing note reviewed.   Constitutional:       Appearance: He is well-developed.   HENT:      Head: Normocephalic and atraumatic.   Eyes:      General: No scleral icterus.     Conjunctiva/sclera: Conjunctivae normal.   Pulmonary:      Effort: Pulmonary effort is normal. No respiratory distress.   Musculoskeletal:         General: No deformity. Normal range of motion.      Cervical back: Normal range of motion and neck supple.   Skin:     Coloration: Skin is not pale.      Findings: No rash.   Neurological:      Mental Status: He is alert and oriented to person, place, and time.   Psychiatric:         Behavior: Behavior  normal.         Thought Content: Thought content normal.         Judgment: Judgment normal.       Assessment:     1. Attention deficit hyperactivity disorder (ADHD), predominantly inattentive type      Plan:   Attention deficit hyperactivity disorder (ADHD), predominantly inattentive type  -     dextroamphetamine-amphetamine 30 mg Tab; Take 1 tablet (30 mg total) by mouth once daily.  Dispense: 30 tablet; Refill: 0  -     dextroamphetamine-amphetamine 30 mg Tab; Take 1 tablet (30 mg total) by mouth every morning. Do not fill until 10/8/23  Dispense: 30 tablet; Refill: 0  -     dextroamphetamine-amphetamine 30 mg Tab; Take 1 tablet (30 mg total) by mouth every morning. Do not fill until 11/8/23  Dispense: 30 tablet; Refill: 0  -     Comprehensive Metabolic Panel; Future; Expected date: 09/08/2023  -     Hemoglobin; Future; Expected date: 09/08/2023  -     Hemoglobin A1C; Future; Expected date: 09/08/2023  -     WBC; Future; Expected date: 09/08/2023  -     Platelet Count; Future; Expected date: 09/08/2023  -     Lipid Panel; Future; Expected date: 09/08/2023            Total time spent of Less than 30 minutes minutes on the day of the visit.This includes face to face time and preparing to see the patient, obtaining and reviewing separately obtained history, documenting clinical information in the electronic or other health record, independently interpreting results and communicating results to the patient/family/caregiver, or care coordinator.    Established patient with me has been instructed that must see me at least 1 time yearly (every 365 days) for refills of medications. Seeing other providers in this clinic is fine but expectation is to see me yearly.    Bandar Colmenares MD  09/08/2023    Portions of this note have been dictated with SHAREE Whelan

## 2023-09-08 NOTE — PROGRESS NOTES
Attempted to contact patient, no answer. Left voicemail to return call to clinic for further assistance with scheduling labs. .

## 2023-10-11 ENCOUNTER — PATIENT MESSAGE (OUTPATIENT)
Dept: FAMILY MEDICINE | Facility: CLINIC | Age: 38
End: 2023-10-11
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
## 2023-10-11 RX ORDER — MELOXICAM 7.5 MG/1
7.5 TABLET ORAL 2 TIMES DAILY PRN
Qty: 60 TABLET | Refills: 0 | Status: SHIPPED | OUTPATIENT
Start: 2023-10-11

## 2023-10-11 NOTE — TELEPHONE ENCOUNTER
Care Due:                  Date            Visit Type   Department     Provider  --------------------------------------------------------------------------------                                ESTABLISHED                              PATIENT -    [facility]  Last Visit: 09-      [employer]      MEDICINE       Bandar Colmenares  Next Visit: None Scheduled  None         None Found                                                            Last  Test          Frequency    Reason                     Performed    Due Date  --------------------------------------------------------------------------------    CBC.........  12 months..  meloxicam................  Not Found    Overdue    CMP.........  12 months..  meloxicam................  09-   09-    Health [facility] Embedded Care Due Messages. Reference number: [id number].   10/11/2023 8:32:02 AM CDT

## 2023-10-13 RX ORDER — VALACYCLOVIR HYDROCHLORIDE 1 G/1
1000 TABLET, FILM COATED ORAL 2 TIMES DAILY
Qty: 60 TABLET | Refills: 11 | Status: SHIPPED | OUTPATIENT
Start: 2023-10-13 | End: 2023-11-16 | Stop reason: SDUPTHER

## 2023-10-13 NOTE — TELEPHONE ENCOUNTER
No care due was identified.  Health [facility] Embedded Care Due Messages. Reference number: [id number].   10/13/2023 8:55:52 AM CDT

## 2023-11-16 DIAGNOSIS — F90.0 ATTENTION DEFICIT HYPERACTIVITY DISORDER (ADHD), PREDOMINANTLY INATTENTIVE TYPE: ICD-10-CM

## 2023-11-16 NOTE — TELEPHONE ENCOUNTER
No care due was identified.  Health [facility] Embedded Care Due Messages. Reference number: [id number].   11/16/2023 10:41:58 AM CST

## 2023-11-17 RX ORDER — DEXTROAMPHETAMINE SACCHARATE, AMPHETAMINE ASPARTATE, DEXTROAMPHETAMINE SULFATE AND AMPHETAMINE SULFATE 7.5; 7.5; 7.5; 7.5 MG/1; MG/1; MG/1; MG/1
1 TABLET ORAL DAILY
Qty: 30 TABLET | Refills: 0 | Status: SHIPPED | OUTPATIENT
Start: 2023-11-17 | End: 2024-01-30 | Stop reason: SDUPTHER

## 2023-11-17 RX ORDER — VALACYCLOVIR HYDROCHLORIDE 1 G/1
1000 TABLET, FILM COATED ORAL 2 TIMES DAILY
Qty: 60 TABLET | Refills: 11 | Status: SHIPPED | OUTPATIENT
Start: 2023-11-17 | End: 2024-11-16

## 2024-01-30 DIAGNOSIS — F90.0 ATTENTION DEFICIT HYPERACTIVITY DISORDER (ADHD), PREDOMINANTLY INATTENTIVE TYPE: ICD-10-CM

## 2024-01-30 RX ORDER — DEXTROAMPHETAMINE SACCHARATE, AMPHETAMINE ASPARTATE, DEXTROAMPHETAMINE SULFATE AND AMPHETAMINE SULFATE 7.5; 7.5; 7.5; 7.5 MG/1; MG/1; MG/1; MG/1
1 TABLET ORAL DAILY
Qty: 30 TABLET | Refills: 0 | Status: SHIPPED | OUTPATIENT
Start: 2024-01-30 | End: 2024-03-25 | Stop reason: SDUPTHER

## 2024-01-30 NOTE — TELEPHONE ENCOUNTER
Care Due:                  Date            Visit Type   Department     Provider  --------------------------------------------------------------------------------                                ESTABLISHED                              PATIENT -    [facility]  Last Visit: 09-      [employer]      MEDICINE       Bandar Colmenares  Next Visit: None Scheduled  None         None Found                                                            Last  Test          Frequency    Reason                     Performed    Due Date  --------------------------------------------------------------------------------    CBC.........  12 months..  meloxicam, valACYclovir..  Not Found    Overdue    CMP.........  12 months..  meloxicam, valACYclovir..  09-   09-    Health [facility] Embedded Care Due Messages. Reference number: [id number].   1/30/2024 8:51:39 AM CST

## 2024-03-25 ENCOUNTER — OFFICE VISIT (OUTPATIENT)
Dept: FAMILY MEDICINE | Facility: CLINIC | Age: 39
End: 2024-03-25

## 2024-03-25 DIAGNOSIS — F90.0 ATTENTION DEFICIT HYPERACTIVITY DISORDER (ADHD), PREDOMINANTLY INATTENTIVE TYPE: ICD-10-CM

## 2024-03-25 PROCEDURE — 99214 OFFICE O/P EST MOD 30 MIN: CPT | Mod: 95,,, | Performed by: FAMILY MEDICINE

## 2024-03-25 RX ORDER — DEXTROAMPHETAMINE SACCHARATE, AMPHETAMINE ASPARTATE, DEXTROAMPHETAMINE SULFATE AND AMPHETAMINE SULFATE 7.5; 7.5; 7.5; 7.5 MG/1; MG/1; MG/1; MG/1
30 TABLET ORAL EVERY MORNING
Qty: 30 TABLET | Refills: 0 | Status: SHIPPED | OUTPATIENT
Start: 2024-03-25

## 2024-03-25 RX ORDER — DEXTROAMPHETAMINE SACCHARATE, AMPHETAMINE ASPARTATE, DEXTROAMPHETAMINE SULFATE AND AMPHETAMINE SULFATE 7.5; 7.5; 7.5; 7.5 MG/1; MG/1; MG/1; MG/1
30 TABLET ORAL EVERY MORNING
Qty: 30 TABLET | Refills: 0 | Status: SHIPPED | OUTPATIENT
Start: 2024-03-25 | End: 2024-05-20 | Stop reason: SDUPTHER

## 2024-03-25 RX ORDER — DEXTROAMPHETAMINE SACCHARATE, AMPHETAMINE ASPARTATE, DEXTROAMPHETAMINE SULFATE AND AMPHETAMINE SULFATE 7.5; 7.5; 7.5; 7.5 MG/1; MG/1; MG/1; MG/1
1 TABLET ORAL DAILY
Qty: 30 TABLET | Refills: 0 | Status: SHIPPED | OUTPATIENT
Start: 2024-03-25

## 2024-04-22 ENCOUNTER — OFFICE VISIT (OUTPATIENT)
Dept: URGENT CARE | Facility: CLINIC | Age: 39
End: 2024-04-22

## 2024-04-22 VITALS
WEIGHT: 217.81 LBS | SYSTOLIC BLOOD PRESSURE: 154 MMHG | RESPIRATION RATE: 18 BRPM | HEART RATE: 92 BPM | DIASTOLIC BLOOD PRESSURE: 85 MMHG | BODY MASS INDEX: 30.49 KG/M2 | OXYGEN SATURATION: 97 % | HEIGHT: 71 IN | TEMPERATURE: 99 F

## 2024-04-22 DIAGNOSIS — L03.90 CELLULITIS, UNSPECIFIED CELLULITIS SITE: Primary | ICD-10-CM

## 2024-04-22 DIAGNOSIS — R59.1 LYMPHADENOPATHY: ICD-10-CM

## 2024-04-22 PROCEDURE — 99213 OFFICE O/P EST LOW 20 MIN: CPT | Mod: S$GLB,,, | Performed by: NURSE PRACTITIONER

## 2024-04-22 RX ORDER — PREDNISONE 10 MG/1
TABLET ORAL
Qty: 8 TABLET | Refills: 0 | Status: SHIPPED | OUTPATIENT
Start: 2024-04-22

## 2024-04-22 RX ORDER — DOXYCYCLINE 100 MG/1
100 CAPSULE ORAL EVERY 12 HOURS
Qty: 20 CAPSULE | Refills: 0 | Status: SHIPPED | OUTPATIENT
Start: 2024-04-22 | End: 2024-05-02

## 2024-04-22 NOTE — PATIENT INSTRUCTIONS
You must understand that you've received an Urgent Care treatment only and that you may be released before all your medical problems are known or treated. You, the patient, will arrange for follow up care as instructed.  Follow up with your PCP or specialty clinic as directed in the next 1-2 weeks if not improved or as needed.  You can call [phone] to schedule an appointment with the appropriate provider.  If your condition worsens we recommend that you receive another evaluation at the emergency room immediately or contact your primary medical clinics after hours call service to discuss your concerns.  Please return here or go to the Emergency Department for any concerns or worsening of condition.  Please if you smoke please consider quitting. [facility] Smoke cessation hotline number is [phone], available at this number is free counseling and medications to live a healthier life!       If you were prescribed a narcotic or controlled medication, do not drive or operate heavy equipment or machinery while taking these medications.    If you were not prescribed an antibiotic and your not better please return for a recheck. Antibiotic therapy is not always indicated initially.   Please attempt over the counter medications, give it time and try Echinacea, Zinc and Vitamin C to fight common colds and virus.
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
## 2024-05-20 DIAGNOSIS — F90.0 ATTENTION DEFICIT HYPERACTIVITY DISORDER (ADHD), PREDOMINANTLY INATTENTIVE TYPE: ICD-10-CM

## 2024-05-20 NOTE — TELEPHONE ENCOUNTER
Care Due:                  Date            Visit Type   Department     Provider  --------------------------------------------------------------------------------                                ESTABLISHED                              PATIENT -    [facility]  Last Visit: 03-      [employer]      MEDICINE       Bandar Colmenares  Next Visit: None Scheduled  None         None Found                                                            Last  Test          Frequency    Reason                     Performed    Due Date  --------------------------------------------------------------------------------    CBC.........  12 months..  meloxicam, valACYclovir..  Not Found    Overdue    CMP.........  12 months..  meloxicam, valACYclovir..  09-   09-    [facility] Embedded Care Due Messages. Reference number: [id number].   5/20/2024 4:52:30 PM CDT

## 2024-05-21 RX ORDER — VALACYCLOVIR HYDROCHLORIDE 1 G/1
1000 TABLET, FILM COATED ORAL 2 TIMES DAILY
Qty: 60 TABLET | Refills: 11 | Status: SHIPPED | OUTPATIENT
Start: 2024-05-21 | End: 2025-05-21

## 2024-05-21 RX ORDER — DEXTROAMPHETAMINE SACCHARATE, AMPHETAMINE ASPARTATE, DEXTROAMPHETAMINE SULFATE AND AMPHETAMINE SULFATE 7.5; 7.5; 7.5; 7.5 MG/1; MG/1; MG/1; MG/1
30 TABLET ORAL EVERY MORNING
Qty: 30 TABLET | Refills: 0 | Status: SHIPPED | OUTPATIENT
Start: 2024-05-21

## 2024-05-29 DIAGNOSIS — F90.0 ATTENTION DEFICIT HYPERACTIVITY DISORDER (ADHD), PREDOMINANTLY INATTENTIVE TYPE: ICD-10-CM

## 2024-05-29 RX ORDER — DEXTROAMPHETAMINE SACCHARATE, AMPHETAMINE ASPARTATE, DEXTROAMPHETAMINE SULFATE AND AMPHETAMINE SULFATE 7.5; 7.5; 7.5; 7.5 MG/1; MG/1; MG/1; MG/1
1 TABLET ORAL DAILY
Qty: 30 TABLET | Refills: 0 | Status: CANCELLED | OUTPATIENT
Start: 2024-05-29

## 2024-05-29 NOTE — TELEPHONE ENCOUNTER
Spoke with [facility]   Adderall 30 mg is back ordered for the    They are unsure when the medication would be available.  He is requesting to be change to Adderall 20 mg    Medication pended

## 2024-05-31 RX ORDER — DEXTROAMPHETAMINE SACCHARATE, AMPHETAMINE ASPARTATE, DEXTROAMPHETAMINE SULFATE AND AMPHETAMINE SULFATE 5; 5; 5; 5 MG/1; MG/1; MG/1; MG/1
TABLET ORAL
Qty: 45 TABLET | Refills: 0 | Status: SHIPPED | OUTPATIENT
Start: 2024-05-31

## 2024-06-05 NOTE — PROGRESS NOTES
Subjective:       Patient ID: Sajan Lane is a 33 y.o. male.    Chief Complaint: Follow-up    Testicle Pain   The patient's primary symptoms include scrotal swelling and testicular pain. The patient's pertinent negatives include no genital injury, genital itching, genital lesions, pelvic pain, penile discharge or priapism. This is a new problem. The current episode started in the past 7 days. The problem occurs constantly. The problem has been gradually worsening. The pain is medium. Pertinent negatives include no abdominal pain, anorexia, diarrhea, dysuria, fever, flank pain, hematuria, joint pain, joint swelling, nausea, painful intercourse, sore throat, urgency, urinary retention or vomiting. The testicular pain affects the right testicle. There is swelling in the right testicle. Testicle color: mild erythema. The symptoms are aggravated by tactile pressure. He has tried nothing for the symptoms. He is sexually active. He inconsistently uses condoms. It is unknown whether or not his partner has an STD. 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 )     Review of patient's allergies indicates:  No Known Allergies      Current Outpatient Medications:     busPIRone (BUSPAR) 5 MG Tab, Take 1 tablet (5 mg total) by mouth 2 (two) times daily., Disp: 180 tablet, Rfl: 1    citalopram (CELEXA) 20 MG tablet, Take 1 tablet (20 mg total) by mouth once daily., Disp: 90 tablet, Rfl: 1    cyanocobalamin-cobamamide 5,000-100 mcg Subl, Place under the tongue., Disp: , Rfl:     melatonin 5 mg Tab, Take by mouth., Disp: , Rfl:     niacin 500 MG CpSR, Take 500 mg by mouth every evening., Disp: , Rfl:     doxycycline (VIBRA-TABS) 100 MG tablet, Take 1 tablet (100 mg total) by mouth 2 (two) times daily. for 10 days, Disp: 20 tablet, Rfl: 0    Current Facility-Administered Medications:     cefTRIAXone injection 250 mg, 250 mg, Intramuscular, 1 time in Clinic/HOD, Roxanne Salter PA-C    Lab Results    Component Value Date    WBC 6.29 10/28/2013    HGB 14.9 10/28/2013    HCT 43.3 10/28/2013     10/28/2013    CHOL 118 (L) 10/28/2013    TRIG 49 10/28/2013    HDL 49 10/28/2013    ALT 26 10/28/2013    AST 29 10/28/2013     10/28/2013    K 4.0 10/28/2013     10/28/2013    CREATININE 1.3 10/28/2013    BUN 23 (H) 10/28/2013    CO2 27 10/28/2013    TSH 1.522 10/28/2013       Review of Systems   Constitutional: Negative for fever.   HENT: Negative for sore throat.    Gastrointestinal: Negative for abdominal pain, anorexia, diarrhea, nausea and vomiting.   Genitourinary: Positive for scrotal swelling and testicular pain. Negative for discharge, dysuria, flank pain, pelvic pain and urgency.   Musculoskeletal: Negative for joint pain.       Objective:      Physical Exam   Constitutional: He is oriented to person, place, and time.   Cardiovascular: Normal rate and regular rhythm.   Pulmonary/Chest: Effort normal and breath sounds normal. He has no wheezes.   Abdominal: Soft. Bowel sounds are normal. There is no tenderness.   Genitourinary: Penis normal. Right testis shows swelling and tenderness. Right testis shows no mass. Left testis shows no mass, no swelling and no tenderness. Circumcised.   Musculoskeletal: He exhibits no edema.   Neurological: He is alert and oriented to person, place, and time.   Skin: No erythema.   Psychiatric: His behavior is normal.       Assessment:       1. Acute epididymitis    2. Testicular swelling    3. Screening for STD (sexually transmitted disease)        Plan:   Sajan was seen today for follow-up.    Diagnoses and all orders for this visit:    Acute epididymitis  -     cefTRIAXone injection 250 mg  -     doxycycline (VIBRA-TABS) 100 MG tablet; Take 1 tablet (100 mg total) by mouth 2 (two) times daily. for 10 days  NSAIDs and ice for pain control  Scrotal elevation  Patient advised to practice safe sex and use protection.     Testicular swelling  -     POCT URINE  [Dear  ___] : Dear  [unfilled], DIPSTICK WITHOUT MICROSCOPE  -     C. trachomatis/N. gonorrhoeae by AMP DNA [facility]; Urine  -     US Scrotum And Testicles; Future    Screening for STD (sexually transmitted disease)  -     POCT URINE DIPSTICK WITHOUT MICROSCOPE  -     C. trachomatis/N. gonorrhoeae by AMP DNA [facility]; Urine  -     HIV 1/2 Ag/Ab (4th Gen); Future  -     RPR; Future    Patient advised that antibiotic will increase sun sensitivity. Patient verbalized understanding.  Patient advised that results of urine specimen and blood work will likely be available in the next 3- 5 days.        [Consult Letter:] : I had the pleasure of evaluating your patient, [unfilled].
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
## 2024-06-20 RX ORDER — MELOXICAM 7.5 MG/1
7.5 TABLET ORAL 2 TIMES DAILY PRN
Qty: 60 TABLET | Refills: 0 | Status: SHIPPED | OUTPATIENT
Start: 2024-06-20

## 2024-06-20 NOTE — TELEPHONE ENCOUNTER
No care due was identified.  Health [facility] Embedded Care Due Messages. Reference number: [id number].   6/20/2024 8:32:50 AM CDT

## 2024-07-07 ENCOUNTER — OFFICE VISIT (OUTPATIENT)
Dept: URGENT CARE | Facility: CLINIC | Age: 39
End: 2024-07-07

## 2024-07-07 VITALS
HEIGHT: 71 IN | DIASTOLIC BLOOD PRESSURE: 80 MMHG | OXYGEN SATURATION: 97 % | SYSTOLIC BLOOD PRESSURE: 136 MMHG | BODY MASS INDEX: 26.74 KG/M2 | RESPIRATION RATE: 17 BRPM | WEIGHT: 191 LBS | HEART RATE: 97 BPM | TEMPERATURE: 98 F

## 2024-07-07 DIAGNOSIS — K52.9 GASTROENTERITIS: Primary | ICD-10-CM

## 2024-07-07 DIAGNOSIS — R11.0 NAUSEA: ICD-10-CM

## 2024-07-07 PROCEDURE — 99214 OFFICE O/P EST MOD 30 MIN: CPT | Mod: S$GLB,,, | Performed by: NURSE PRACTITIONER

## 2024-07-07 RX ORDER — CIPROFLOXACIN 500 MG/1
500 TABLET ORAL EVERY 12 HOURS
Qty: 14 TABLET | Refills: 0 | Status: SHIPPED | OUTPATIENT
Start: 2024-07-07 | End: 2024-07-14

## 2024-07-07 RX ORDER — ONDANSETRON 4 MG/1
4 TABLET, ORALLY DISINTEGRATING ORAL EVERY 6 HOURS PRN
Qty: 12 TABLET | Refills: 0 | Status: SHIPPED | OUTPATIENT
Start: 2024-07-07 | End: 2024-07-10

## 2024-07-07 RX ORDER — DICYCLOMINE HYDROCHLORIDE 10 MG/1
10 CAPSULE ORAL EVERY 6 HOURS PRN
Qty: 20 CAPSULE | Refills: 0 | Status: SHIPPED | OUTPATIENT
Start: 2024-07-07 | End: 2024-07-12

## 2024-07-07 RX ORDER — METRONIDAZOLE 500 MG/1
500 TABLET ORAL EVERY 8 HOURS
Qty: 21 TABLET | Refills: 0 | Status: SHIPPED | OUTPATIENT
Start: 2024-07-07 | End: 2024-07-14

## 2024-07-07 NOTE — PROGRESS NOTES
"Subjective:      Patient ID: Sajan Lane is a 38 y.o. male.    Vitals:  height is 5' 11" (1.803 m) and weight is 86.6 kg (191 lb). His oral temperature is 98.3 °F (36.8 °C). His blood pressure is 136/80 and his pulse is 97. His respiration is 17 and oxygen saturation is 97%.     Chief Complaint: Abdominal Pain    38 y.o. afebrile male NVD and lower abdominal cramping x 3 days after eating [employer]. He does not reports any hematemesis or bloody stool.    Abdominal Pain  This is a new problem. The current episode started in the past 7 days. The onset quality is sudden. The problem occurs constantly. The problem has been gradually worsening. The pain is located in the generalized abdominal region. The pain is at a severity of 4/10. The pain is mild. The quality of the pain is aching. The abdominal pain does not radiate. Associated symptoms include diarrhea, myalgias, nausea and vomiting. Nothing aggravates the pain. The pain is relieved by Nothing. He has tried antacids for the symptoms. The treatment provided no relief.       Gastrointestinal:  Positive for abdominal pain, nausea, vomiting and diarrhea.   Musculoskeletal:  Positive for muscle ache.      Objective:     Physical Exam   Constitutional: He is oriented to person, place, and time.  Non-toxic appearance. He does not appear ill. No distress. normal  HENT:   Head: Normocephalic and atraumatic.   Nose: Nose normal.   Mouth/Throat: Mucous membranes are moist. No posterior oropharyngeal erythema. Oropharynx is clear.   Eyes: Conjunctivae are normal. Extraocular movement intact   Neck: Neck supple. No neck rigidity present.   Cardiovascular: Normal rate, regular rhythm, normal heart sounds and normal pulses.   Pulmonary/Chest: Effort normal and breath sounds normal.   Abdominal: Normal appearance and bowel sounds are normal. He exhibits no distension and no mass. Soft. flat abdomen There is no abdominal tenderness. There is no rebound and no guarding. No " hernia.   Musculoskeletal: Normal range of motion.         General: Normal range of motion.      Cervical back: He exhibits no tenderness.   Lymphadenopathy:     He has no cervical adenopathy.   Neurological: He is alert and oriented to person, place, and time.   Skin: Skin is warm, dry, not diaphoretic and no rash.   Psychiatric: His behavior is normal.   Vitals reviewed.      Assessment:     1. Gastroenteritis    2. Nausea        Plan:       Gastroenteritis  -     dicyclomine (BENTYL) 10 MG capsule; Take 1 capsule (10 mg total) by mouth every 6 (six) hours as needed (Abdominal Cramping).  Dispense: 20 capsule; Refill: 0  -     metroNIDAZOLE (FLAGYL) 500 MG tablet; Take 1 tablet (500 mg total) by mouth every 8 (eight) hours. for 7 days  Dispense: 21 tablet; Refill: 0  -     ciprofloxacin HCl (CIPRO) 500 MG tablet; Take 1 tablet (500 mg total) by mouth every 12 (twelve) hours. for 7 days  Dispense: 14 tablet; Refill: 0    Nausea  -     ondansetron (ZOFRAN-ODT) 4 MG TbDL; Take 1 tablet (4 mg total) by mouth every 6 (six) hours as needed (Nausea).  Dispense: 12 tablet; Refill: 0      INSTRUCTIONS  Meds as prescribed. Clear liquid diet x 24hr, followed by BRAT diet. Then, advance diet as tolerated.

## 2024-07-09 ENCOUNTER — PATIENT MESSAGE (OUTPATIENT)
Dept: FAMILY MEDICINE | Facility: CLINIC | Age: 39
End: 2024-07-09
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
## 2024-07-18 NOTE — TELEPHONE ENCOUNTER
No care due was identified.  [facility] Embedded Care Due Messages. Reference number: [id number].   7/18/2024 11:14:18 AM CDT

## 2024-07-19 RX ORDER — MELOXICAM 7.5 MG/1
7.5 TABLET ORAL 2 TIMES DAILY PRN
Qty: 60 TABLET | Refills: 0 | Status: SHIPPED | OUTPATIENT
Start: 2024-07-19

## 2024-07-30 ENCOUNTER — PATIENT MESSAGE (OUTPATIENT)
Dept: FAMILY MEDICINE | Facility: CLINIC | Age: 39
End: 2024-07-30

## 2024-08-01 NOTE — TELEPHONE ENCOUNTER
My [facility] portal message not yet viewed by patient.  Follow up call placed to patient. No answer received. Left detailed message requesting return call to office.

## 2024-08-06 RX ORDER — DEXTROAMPHETAMINE SACCHARATE, AMPHETAMINE ASPARTATE, DEXTROAMPHETAMINE SULFATE AND AMPHETAMINE SULFATE 5; 5; 5; 5 MG/1; MG/1; MG/1; MG/1
TABLET ORAL
Qty: 45 TABLET | Refills: 0 | Status: SHIPPED | OUTPATIENT
Start: 2024-08-06

## 2024-08-07 ENCOUNTER — TELEPHONE (OUTPATIENT)
Dept: FAMILY MEDICINE | Facility: CLINIC | Age: 39
End: 2024-08-07

## 2024-08-12 ENCOUNTER — OFFICE VISIT (OUTPATIENT)
Dept: FAMILY MEDICINE | Facility: CLINIC | Age: 39
End: 2024-08-12

## 2024-08-12 VITALS
TEMPERATURE: 98 F | OXYGEN SATURATION: 97 % | RESPIRATION RATE: 16 BRPM | SYSTOLIC BLOOD PRESSURE: 118 MMHG | DIASTOLIC BLOOD PRESSURE: 70 MMHG | BODY MASS INDEX: 28.92 KG/M2 | HEART RATE: 83 BPM | WEIGHT: 206.56 LBS | HEIGHT: 71 IN

## 2024-08-12 DIAGNOSIS — Z00.00 HEALTHCARE MAINTENANCE: Primary | ICD-10-CM

## 2024-08-12 DIAGNOSIS — F90.0 ATTENTION DEFICIT HYPERACTIVITY DISORDER (ADHD), PREDOMINANTLY INATTENTIVE TYPE: ICD-10-CM

## 2024-08-12 PROCEDURE — 99999 PR PBB SHADOW E&M-EST. PATIENT-LVL IV: CPT | Mod: PBBFAC,,, | Performed by: FAMILY MEDICINE

## 2024-08-12 PROCEDURE — 99214 OFFICE O/P EST MOD 30 MIN: CPT | Mod: PBBFAC,PO | Performed by: FAMILY MEDICINE

## 2024-08-12 PROCEDURE — G2211 COMPLEX E/M VISIT ADD ON: HCPCS | Mod: S$PBB,,, | Performed by: FAMILY MEDICINE

## 2024-08-12 PROCEDURE — 99214 OFFICE O/P EST MOD 30 MIN: CPT | Mod: S$PBB,,, | Performed by: FAMILY MEDICINE

## 2024-08-12 RX ORDER — DEXTROAMPHETAMINE SACCHARATE, AMPHETAMINE ASPARTATE, DEXTROAMPHETAMINE SULFATE AND AMPHETAMINE SULFATE 5; 5; 5; 5 MG/1; MG/1; MG/1; MG/1
TABLET ORAL
Qty: 45 TABLET | Refills: 0 | Status: SHIPPED | OUTPATIENT
Start: 2024-08-12

## 2024-08-12 NOTE — PROGRESS NOTES
Subjective:   Patient ID: Sajan Lane is a 38 y.o. male     Chief Complaint:Follow-up and Medication Refill      Here for checkup    Follow-up  Pertinent negatives include no abdominal pain or chest pain.   Medication Refill  Pertinent negatives include no abdominal pain or chest pain.     Review of Systems   Respiratory:  Negative for shortness of breath.    Cardiovascular:  Negative for chest pain.   Gastrointestinal:  Negative for abdominal pain.   Genitourinary:  Negative for dysuria.     Past Medical History:   Diagnosis Date    ADHD (attention deficit hyperactivity disorder)     Dyslexia      Past Surgical History:   Procedure Laterality Date    EXTRACTION OF TOOTH      KNEE SURGERY      reconstruction left knee from birth age 4      Objective:     Vitals:    08/12/24 1540   BP: 118/70   Pulse: 83   Resp: 16   Temp: 98.1 °F (36.7 °C)     Body mass index is 28.81 kg/m².  Physical Exam  Vitals and nursing note reviewed.   Constitutional:       Appearance: He is well-developed.   HENT:      Head: Normocephalic and atraumatic.   Eyes:      General: No scleral icterus.     Conjunctiva/sclera: Conjunctivae normal.   Cardiovascular:      Heart sounds: No murmur heard.  Pulmonary:      Effort: Pulmonary effort is normal. No respiratory distress.   Musculoskeletal:         General: No deformity. Normal range of motion.      Cervical back: Normal range of motion and neck supple.   Skin:     Coloration: Skin is not pale.      Findings: No rash.   Neurological:      Mental Status: He is alert and oriented to person, place, and time.   Psychiatric:         Behavior: Behavior normal.         Thought Content: Thought content normal.         Judgment: Judgment normal.       Assessment:     1. Healthcare maintenance    2. Attention deficit hyperactivity disorder (ADHD), predominantly inattentive type      Plan:   Healthcare maintenance  -     Comprehensive Metabolic Panel; Future; Expected date: 08/12/2024  -      Hemoglobin A1C; Future; Expected date: 08/12/2024  -     Lipid Panel; Future; Expected date: 08/12/2024  -     CBC Auto Differential; Future; Expected date: 08/12/2024    Attention deficit hyperactivity disorder (ADHD), predominantly inattentive type  -     dextroamphetamine-amphetamine (ADDERALL) 20 mg tablet; Take 1.5 tablet by mouth once daily. Do not fill until 9/6/24  Dispense: 45 tablet; Refill: 0  -     dextroamphetamine-amphetamine (ADDERALL) 20 mg tablet; Take 1.5 tablet by mouth once daily. Do not fill until 10/6/24  Dispense: 45 tablet; Refill: 0            Total time spent of Greater than 30 minutes minutes on the day of the visit.This includes face to face time and preparing to see the patient, obtaining and reviewing separately obtained history, documenting clinical information in the electronic or other health record, independently interpreting results and communicating results to the patient/family/caregiver, or care coordinator.    Established patient with me has been instructed that must see me at least 1 time yearly (every 365 days) for refills of medications. Seeing other providers in this clinic is fine but expectation is to see me yearly.    Bandar Colmenares MD  08/12/2024    Portions of this note have been dictated with SHAREE Whelan

## 2024-08-23 ENCOUNTER — LAB VISIT (OUTPATIENT)
Dept: LAB | Facility: HOSPITAL | Age: 39
End: 2024-08-23
Attending: FAMILY MEDICINE

## 2024-08-23 DIAGNOSIS — Z00.00 HEALTHCARE MAINTENANCE: ICD-10-CM

## 2024-08-23 LAB
ALBUMIN SERPL BCP-MCNC: 4.2 G/DL (ref 3.5–5.2)
ALP SERPL-CCNC: 83 U/L (ref 55–135)
ALT SERPL W/O P-5'-P-CCNC: 27 U/L (ref 10–44)
ANION GAP SERPL CALC-SCNC: 11 MMOL/L (ref 8–16)
AST SERPL-CCNC: 22 U/L (ref 10–40)
BASOPHILS # BLD AUTO: 0.07 K/UL (ref 0–0.2)
BASOPHILS NFR BLD: 1 % (ref 0–1.9)
BILIRUB SERPL-MCNC: 1.6 MG/DL (ref 0.1–1)
BUN SERPL-MCNC: 15 MG/DL (ref 6–20)
CALCIUM SERPL-MCNC: 9.6 MG/DL (ref 8.7–10.5)
CHLORIDE SERPL-SCNC: 106 MMOL/L (ref 95–110)
CHOLEST SERPL-MCNC: 138 MG/DL (ref 120–199)
CHOLEST/HDLC SERPL: 3.3 {RATIO} (ref 2–5)
CO2 SERPL-SCNC: 25 MMOL/L (ref 23–29)
CREAT SERPL-MCNC: 1 MG/DL (ref 0.5–1.4)
DIFFERENTIAL METHOD BLD: ABNORMAL
EOSINOPHIL # BLD AUTO: 0.5 K/UL (ref 0–0.5)
EOSINOPHIL NFR BLD: 7.5 % (ref 0–8)
ERYTHROCYTE [DISTWIDTH] IN BLOOD BY AUTOMATED COUNT: 12.1 % (ref 11.5–14.5)
EST. GFR  (NO RACE VARIABLE): >60 ML/MIN/1.73 M^2
ESTIMATED AVG GLUCOSE: 88 MG/DL (ref 68–131)
GLUCOSE SERPL-MCNC: 94 MG/DL (ref 70–110)
HBA1C MFR BLD: 4.7 % (ref 4–5.6)
HCT VFR BLD AUTO: 42.4 % (ref 40–54)
HDLC SERPL-MCNC: 42 MG/DL (ref 40–75)
HDLC SERPL: 30.4 % (ref 20–50)
HGB BLD-MCNC: 14.5 G/DL (ref 14–18)
IMM GRANULOCYTES # BLD AUTO: 0.02 K/UL (ref 0–0.04)
IMM GRANULOCYTES NFR BLD AUTO: 0.3 % (ref 0–0.5)
LDLC SERPL CALC-MCNC: 80.2 MG/DL (ref 63–159)
LYMPHOCYTES # BLD AUTO: 2.6 K/UL (ref 1–4.8)
LYMPHOCYTES NFR BLD: 38.2 % (ref 18–48)
MCH RBC QN AUTO: 32 PG (ref 27–31)
MCHC RBC AUTO-ENTMCNC: 34.2 G/DL (ref 32–36)
MCV RBC AUTO: 94 FL (ref 82–98)
MONOCYTES # BLD AUTO: 0.5 K/UL (ref 0.3–1)
MONOCYTES NFR BLD: 6.7 % (ref 4–15)
NEUTROPHILS # BLD AUTO: 3.2 K/UL (ref 1.8–7.7)
NEUTROPHILS NFR BLD: 46.3 % (ref 38–73)
NONHDLC SERPL-MCNC: 96 MG/DL
NRBC BLD-RTO: 0 /100 WBC
PLATELET # BLD AUTO: 215 K/UL (ref 150–450)
PMV BLD AUTO: 10.8 FL (ref 9.2–12.9)
POTASSIUM SERPL-SCNC: 4.5 MMOL/L (ref 3.5–5.1)
PROT SERPL-MCNC: 7.1 G/DL (ref 6–8.4)
RBC # BLD AUTO: 4.53 M/UL (ref 4.6–6.2)
SODIUM SERPL-SCNC: 142 MMOL/L (ref 136–145)
TRIGL SERPL-MCNC: 79 MG/DL (ref 30–150)
WBC # BLD AUTO: 6.89 K/UL (ref 3.9–12.7)

## 2024-08-23 PROCEDURE — 80061 LIPID PANEL: CPT | Performed by: FAMILY MEDICINE

## 2024-08-23 PROCEDURE — 85025 COMPLETE CBC W/AUTO DIFF WBC: CPT | Performed by: FAMILY MEDICINE

## 2024-08-23 PROCEDURE — 80053 COMPREHEN METABOLIC PANEL: CPT | Performed by: FAMILY MEDICINE

## 2024-08-23 PROCEDURE — 83036 HEMOGLOBIN GLYCOSYLATED A1C: CPT | Performed by: FAMILY MEDICINE

## 2024-09-12 ENCOUNTER — PATIENT MESSAGE (OUTPATIENT)
Dept: FAMILY MEDICINE | Facility: CLINIC | Age: 39
End: 2024-09-12

## 2024-11-11 ENCOUNTER — OFFICE VISIT (OUTPATIENT)
Dept: FAMILY MEDICINE | Facility: CLINIC | Age: 39
End: 2024-11-11

## 2024-11-11 DIAGNOSIS — B00.1 COLD SORE: ICD-10-CM

## 2024-11-11 DIAGNOSIS — F90.0 ATTENTION DEFICIT HYPERACTIVITY DISORDER (ADHD), PREDOMINANTLY INATTENTIVE TYPE: Primary | ICD-10-CM

## 2024-11-11 PROCEDURE — 99214 OFFICE O/P EST MOD 30 MIN: CPT | Mod: 95,,, | Performed by: FAMILY MEDICINE

## 2024-11-11 PROCEDURE — G2211 COMPLEX E/M VISIT ADD ON: HCPCS | Mod: 95,,, | Performed by: FAMILY MEDICINE

## 2024-11-11 RX ORDER — VALACYCLOVIR HYDROCHLORIDE 1 G/1
1000 TABLET, FILM COATED ORAL 2 TIMES DAILY
Qty: 60 TABLET | Refills: 11 | Status: SHIPPED | OUTPATIENT
Start: 2024-11-11 | End: 2025-11-11

## 2024-11-11 RX ORDER — DEXTROAMPHETAMINE SACCHARATE, AMPHETAMINE ASPARTATE, DEXTROAMPHETAMINE SULFATE AND AMPHETAMINE SULFATE 5; 5; 5; 5 MG/1; MG/1; MG/1; MG/1
TABLET ORAL
Qty: 45 TABLET | Refills: 0 | Status: SHIPPED | OUTPATIENT
Start: 2024-11-11

## 2024-11-12 NOTE — PROGRESS NOTES
The patient location is:  [place]  The chief complaint leading to consultation is:  Checkup  Visit type: Virtual visit with synchronous audio and video  Total time spent with patient:  Less than 30 minutes  Each patient to whom he or she provides medical services by telemedicine is:  (1) informed of the relationship between the physician and patient and the respective role of any other health care provider with respect to management of the patient; and (2) notified that he or she may decline to receive medical services by telemedicine and may withdraw from such care at any time. Vital signs recorded were provided by the patient.    Notes:  See below    Subjective:   Patient ID: Sajan Lane is a 38 y.o. male     Chief Complaint: Checkup    Here for checkup      Review of Systems   Respiratory:  Negative for shortness of breath.    Cardiovascular:  Negative for chest pain.   Gastrointestinal:  Negative for abdominal pain.   Genitourinary:  Negative for dysuria.     Past Medical History:   Diagnosis Date    ADHD (attention deficit hyperactivity disorder)     Dyslexia      Past Surgical History:   Procedure Laterality Date    EXTRACTION OF TOOTH      KNEE SURGERY      reconstruction left knee from birth age 4      Objective:   There were no vitals filed for this visit.  There is no height or weight on file to calculate BMI.  Physical Exam  Vitals and nursing note reviewed.   Constitutional:       Appearance: He is well-developed.   HENT:      Head: Normocephalic and atraumatic.   Eyes:      General: No scleral icterus.     Conjunctiva/sclera: Conjunctivae normal.   Pulmonary:      Effort: Pulmonary effort is normal. No respiratory distress.   Musculoskeletal:         General: No deformity. Normal range of motion.      Cervical back: Normal range of motion and neck supple.   Skin:     Coloration: Skin is not pale.      Findings: No rash.   Neurological:      Mental Status: He is alert and oriented to person,  place, and time.   Psychiatric:         Behavior: Behavior normal.         Thought Content: Thought content normal.         Judgment: Judgment normal.       Assessment:     1. Attention deficit hyperactivity disorder (ADHD), predominantly inattentive type    2. Cold sore      Plan:   Attention deficit hyperactivity disorder (ADHD), predominantly inattentive type  -     dextroamphetamine-amphetamine (ADDERALL) 20 mg tablet; Take 1.5 tablet by mouth once daily.  Dispense: 45 tablet; Refill: 0  -     dextroamphetamine-amphetamine (ADDERALL) 20 mg tablet; Take 1.5 tablet by mouth once daily. Do not fill until 12/11/24  Dispense: 45 tablet; Refill: 0  -     dextroamphetamine-amphetamine (ADDERALL) 20 mg tablet; Take 1.5 tablet by mouth once daily. Do not fill until 1/11/25  Dispense: 45 tablet; Refill: 0    Cold sore  -     valACYclovir (VALTREX) 1000 MG tablet; Take 1 tablet (1,000 mg total) by mouth 2 (two) times daily.  Dispense: 60 tablet; Refill: 11            Total time spent of Less than 30 minutes minutes on the day of the visit.This includes face to face time and preparing to see the patient, obtaining and reviewing separately obtained history, documenting clinical information in the electronic or other health record, independently interpreting results and communicating results to the patient/family/caregiver, or care coordinator.    Established patient with me has been instructed that must see me at least 1 time yearly (every 365 days) for refills of medications. Seeing other providers in this clinic is fine but expectation is to see me yearly.    Bandar Colmenares MD  11/12/2024    Portions of this note have been dictated with SHAREE Whelan

## 2025-02-17 ENCOUNTER — OFFICE VISIT (OUTPATIENT)
Dept: URGENT CARE | Facility: CLINIC | Age: 40
End: 2025-02-17

## 2025-02-17 VITALS
TEMPERATURE: 98 F | BODY MASS INDEX: 28.7 KG/M2 | HEIGHT: 71 IN | WEIGHT: 205 LBS | RESPIRATION RATE: 19 BRPM | SYSTOLIC BLOOD PRESSURE: 126 MMHG | OXYGEN SATURATION: 99 % | HEART RATE: 59 BPM | DIASTOLIC BLOOD PRESSURE: 77 MMHG

## 2025-02-17 DIAGNOSIS — R09.81 NASAL CONGESTION: ICD-10-CM

## 2025-02-17 DIAGNOSIS — J98.8 VIRAL RESPIRATORY ILLNESS: Primary | ICD-10-CM

## 2025-02-17 DIAGNOSIS — B97.89 VIRAL RESPIRATORY ILLNESS: Primary | ICD-10-CM

## 2025-02-17 RX ORDER — LORATADINE AND PSEUDOEPHEDRINE SULFATE 5; 120 MG/1; MG/1
1 TABLET, EXTENDED RELEASE ORAL 2 TIMES DAILY
COMMUNITY
Start: 2025-02-17 | End: 2025-02-27

## 2025-02-17 RX ORDER — TIZANIDINE 4 MG/1
4 TABLET ORAL EVERY 6 HOURS PRN
COMMUNITY
Start: 2025-01-28

## 2025-02-17 RX ORDER — FLUTICASONE PROPIONATE 50 MCG
1 SPRAY, SUSPENSION (ML) NASAL DAILY
Qty: 9.9 ML | Refills: 0 | Status: SHIPPED | OUTPATIENT
Start: 2025-02-17

## 2025-02-17 RX ORDER — LIDOCAINE 50 MG/G
1 PATCH TOPICAL
COMMUNITY
Start: 2025-01-28

## 2025-02-17 NOTE — PROGRESS NOTES
" Subjective:      Patient ID: Sajan Lane is a 39 y.o. male.    Vitals:  height is 5' 11" (1.803 m) and weight is 93 kg (205 lb). His blood pressure is 126/77 and his pulse is 59 (abnormal). His respiration is 19.     Chief Complaint: Nasal Congestion    This is a 39 y.o. male who presents today with a chief complaint of sinus congestion and drainage which started early this morning. Patient has taken Claritin and dayquil. Has not gotten any relief.   Patient presents with:  Nasal Congestion        Sinus Problem  This is a new problem. The current episode started today. The problem is unchanged. There has been no fever. His pain is at a severity of 0/10. Associated symptoms include congestion. Pertinent negatives include no coughing, sinus pressure or sore throat. Treatments tried: claritin and dayquil. The treatment provided no relief.       Constitution: Negative.   HENT:  Positive for congestion and postnasal drip. Negative for sinus pain, sinus pressure and sore throat.    Neck: neck negative.   Cardiovascular: Negative.    Eyes: Negative.    Respiratory: Negative.  Negative for cough.    Gastrointestinal: Negative.    Endocrine: negative.   Genitourinary: Negative.    Musculoskeletal: Negative.    Skin: Negative.    Allergic/Immunologic: Negative.    Neurological: Negative.    Hematologic/Lymphatic: Negative.    Psychiatric/Behavioral: Negative.        Objective:     Physical Exam   Constitutional: He is oriented to person, place, and time.   HENT:   Head: Normocephalic and atraumatic.   Ears:   Right Ear: Tympanic membrane, external ear and ear canal normal.   Left Ear: Tympanic membrane, external ear and ear canal normal.   Nose: Congestion present.   Mouth/Throat: Mucous membranes are moist. Oropharynx is clear.   Eyes: Conjunctivae are normal. Pupils are equal, round, and reactive to light. Extraocular movement intact   Neck: Neck supple.   Cardiovascular: Normal rate, regular rhythm, normal heart " sounds and normal pulses.   Pulmonary/Chest: Effort normal and breath sounds normal.   Abdominal: Normal appearance.   Musculoskeletal: Normal range of motion.         General: Normal range of motion.   Neurological: no focal deficit. He is alert, oriented to person, place, and time and at baseline.   Skin: Skin is warm.   Psychiatric: His behavior is normal. Mood, judgment and thought content normal.   Nursing note and vitals reviewed.      Assessment:     1. Viral respiratory illness    2. Nasal congestion        Plan:       Viral respiratory illness  -     fluticasone propionate (FLONASE) 50 mcg/actuation nasal spray; 1 spray (50 mcg total) by Each Nostril route once daily.  Dispense: 9.9 mL; Refill: 0  -     loratadine-pseudoephedrine 5-120 mg (CLARITIN-D 12 HOUR) 5-120 mg per tablet; Take 1 tablet by mouth 2 (two) times daily. for 10 days    Nasal congestion  -     fluticasone propionate (FLONASE) 50 mcg/actuation nasal spray; 1 spray (50 mcg total) by Each Nostril route once daily.  Dispense: 9.9 mL; Refill: 0  -     loratadine-pseudoephedrine 5-120 mg (CLARITIN-D 12 HOUR) 5-120 mg per tablet; Take 1 tablet by mouth 2 (two) times daily. for 10 days

## 2025-03-25 ENCOUNTER — OFFICE VISIT (OUTPATIENT)
Dept: FAMILY MEDICINE | Facility: CLINIC | Age: 40
End: 2025-03-25

## 2025-03-25 ENCOUNTER — HOSPITAL ENCOUNTER (OUTPATIENT)
Dept: RADIOLOGY | Facility: HOSPITAL | Age: 40
Discharge: HOME | End: 2025-03-25
Attending: FAMILY MEDICINE

## 2025-03-25 VITALS
SYSTOLIC BLOOD PRESSURE: 128 MMHG | BODY MASS INDEX: 30.59 KG/M2 | TEMPERATURE: 97 F | HEIGHT: 71 IN | DIASTOLIC BLOOD PRESSURE: 88 MMHG | OXYGEN SATURATION: 98 % | RESPIRATION RATE: 16 BRPM | WEIGHT: 218.5 LBS | HEART RATE: 78 BPM

## 2025-03-25 DIAGNOSIS — R07.89 CHEST DISCOMFORT: ICD-10-CM

## 2025-03-25 DIAGNOSIS — F90.0 ATTENTION DEFICIT HYPERACTIVITY DISORDER (ADHD), PREDOMINANTLY INATTENTIVE TYPE: ICD-10-CM

## 2025-03-25 DIAGNOSIS — R07.89 CHEST DISCOMFORT: Primary | ICD-10-CM

## 2025-03-25 LAB
OHS QRS DURATION: 98 MS
OHS QTC CALCULATION: 421 MS

## 2025-03-25 PROCEDURE — 99999 PR PBB SHADOW E&M-EST. PATIENT-LVL V: CPT | Mod: PBBFAC,,, | Performed by: FAMILY MEDICINE

## 2025-03-25 PROCEDURE — 71046 X-RAY EXAM CHEST 2 VIEWS: CPT | Mod: 26,,, | Performed by: RADIOLOGY

## 2025-03-25 PROCEDURE — 99215 OFFICE O/P EST HI 40 MIN: CPT | Mod: PBBFAC,25,PO | Performed by: FAMILY MEDICINE

## 2025-03-25 PROCEDURE — G2211 COMPLEX E/M VISIT ADD ON: HCPCS | Mod: S$PBB,,, | Performed by: FAMILY MEDICINE

## 2025-03-25 PROCEDURE — 93010 ELECTROCARDIOGRAM REPORT: CPT | Mod: S$PBB,,, | Performed by: INTERNAL MEDICINE

## 2025-03-25 PROCEDURE — 71046 X-RAY EXAM CHEST 2 VIEWS: CPT | Mod: TC

## 2025-03-25 PROCEDURE — 93005 ELECTROCARDIOGRAM TRACING: CPT | Mod: PBBFAC,PO | Performed by: INTERNAL MEDICINE

## 2025-03-25 PROCEDURE — 99214 OFFICE O/P EST MOD 30 MIN: CPT | Mod: S$PBB,,, | Performed by: FAMILY MEDICINE

## 2025-03-25 RX ORDER — DEXTROAMPHETAMINE SACCHARATE, AMPHETAMINE ASPARTATE, DEXTROAMPHETAMINE SULFATE AND AMPHETAMINE SULFATE 5; 5; 5; 5 MG/1; MG/1; MG/1; MG/1
TABLET ORAL
Qty: 45 TABLET | Refills: 0 | Status: SHIPPED | OUTPATIENT
Start: 2025-03-25

## 2025-03-25 NOTE — PROGRESS NOTES
The patient location is:  {patient location:[address]}  The chief complaint leading to consultation is: Chest Pain (mild)    Visit type: Virtual visit with synchronous audio and video  Total time spent with patient:  {time spent:47380}  Each patient to whom he or she provides medical services by telemedicine is:  (1) informed of the relationship between the physician and patient and the respective role of any other health care provider with respect to management of the patient; and (2) notified that he or she may decline to receive medical services by telemedicine and may withdraw from such care at any time. Vital signs recorded were provided by the patient.    Notes:  See below    Subjective:   Patient ID: Sajan Lane is a 39 y.o. male     Chief Complaint:Chest Pain (mild)      HPI  Review of Systems  Past Medical History:   Diagnosis Date    ADHD (attention deficit hyperactivity disorder)     Dyslexia      Past Surgical History:   Procedure Laterality Date    EXTRACTION OF TOOTH      KNEE SURGERY      reconstruction left knee from birth age 4      Objective:     Vitals:    03/25/25 1311   BP: 128/88   Pulse: 78   Resp: 16   Temp: 97.4 °F (36.3 °C)     Body mass index is 30.47 kg/m².  Physical Exam  Assessment:     1. Chest discomfort    2. Attention deficit hyperactivity disorder (ADHD), predominantly inattentive type      Plan:   Chest discomfort  -     IN OFFICE EKG 12-LEAD (to [facility])  -     X-Ray Chest PA And Lateral; Future; Expected date: 03/25/2025  -     CBC Auto Differential; Future; Expected date: 03/25/2025  -     Comprehensive Metabolic Panel; Future; Expected date: 03/25/2025  -     Troponin I; Future; Expected date: 03/25/2025    Attention deficit hyperactivity disorder (ADHD), predominantly inattentive type  -     dextroamphetamine-amphetamine (ADDERALL) 20 mg tablet; Take 1.5 tablet by mouth once daily.  Dispense: 45 tablet; Refill: 0  -     dextroamphetamine-amphetamine (ADDERALL) 20 mg tablet;  Take 1.5 tablet by mouth once daily. Do not fill until 5/25/25  Dispense: 45 tablet; Refill: 0  -     dextroamphetamine-amphetamine (ADDERALL) 20 mg tablet; Take 1.5 tablet by mouth once daily. Do not fill until 4/25/25  Dispense: 45 tablet; Refill: 0      Chronic condition not otherwise mentioned is stable      Total time spent of {time spent:66382} minutes on the day of the visit.This includes face to face time and preparing to see the patient, obtaining and reviewing separately obtained history, documenting clinical information in the electronic or other health record, independently interpreting results and communicating results to the patient/family/caregiver, or care coordinator.    Established patient with me has been instructed that must see me at least 1 time yearly (every 365 days) for refills of medications. Seeing other providers in this clinic is fine but expectation is to see me yearly.    Bandar Colmenares MD  03/25/2025    Portions of this note have been dictated with SHAREE Whelan

## 2025-03-26 ENCOUNTER — RESULTS FOLLOW-UP (OUTPATIENT)
Dept: FAMILY MEDICINE | Facility: CLINIC | Age: 40
End: 2025-03-26

## 2025-03-31 ENCOUNTER — TELEPHONE (OUTPATIENT)
Dept: FAMILY MEDICINE | Facility: CLINIC | Age: 40
End: 2025-03-31

## 2025-03-31 DIAGNOSIS — R07.9 CHEST PAIN, UNSPECIFIED TYPE: Primary | ICD-10-CM

## 2025-03-31 NOTE — TELEPHONE ENCOUNTER
----- Message from Bandar Colmenares MD sent at 3/26/2025 10:59 AM CDT -----  Overall labs in acceptable ranges. The final step in ruling out cardiovascular cause would be a stress test. Would he like to have this ordered?  ----- Message -----  From: Lab, Background User  Sent: 3/25/2025   2:41 PM CDT  To: Bandar Colmenares MD

## 2025-03-31 NOTE — TELEPHONE ENCOUNTER
If patient is still having chest pain a stress test would be the next step. If his symptoms have resolved it would be ok to wait but need immediate follow up if the symptoms recur. I have placed the order for stress test in case his symptoms are persistent.

## 2025-07-29 DIAGNOSIS — B00.1 COLD SORE: ICD-10-CM

## 2025-07-29 DIAGNOSIS — F90.0 ATTENTION DEFICIT HYPERACTIVITY DISORDER (ADHD), PREDOMINANTLY INATTENTIVE TYPE: ICD-10-CM

## 2025-07-29 RX ORDER — DEXTROAMPHETAMINE SACCHARATE, AMPHETAMINE ASPARTATE, DEXTROAMPHETAMINE SULFATE AND AMPHETAMINE SULFATE 5; 5; 5; 5 MG/1; MG/1; MG/1; MG/1
TABLET ORAL
Qty: 45 TABLET | Refills: 0 | OUTPATIENT
Start: 2025-07-29

## 2025-07-29 RX ORDER — VALACYCLOVIR HYDROCHLORIDE 1 G/1
1000 TABLET, FILM COATED ORAL 2 TIMES DAILY
Qty: 60 TABLET | Refills: 11 | OUTPATIENT
Start: 2025-07-29 | End: 2026-07-29

## 2025-07-29 RX ORDER — VALACYCLOVIR HYDROCHLORIDE 1 G/1
1000 TABLET, FILM COATED ORAL 2 TIMES DAILY
Qty: 60 TABLET | Refills: 11 | Status: CANCELLED | OUTPATIENT
Start: 2025-07-29 | End: 2026-07-29

## 2025-07-29 NOTE — TELEPHONE ENCOUNTER
No care due was identified.  [facility] Embedded Care Due Messages. Reference number: [id number].   7/29/2025 2:36:17 PM CDT

## 2025-08-05 RX ORDER — DEXTROAMPHETAMINE SACCHARATE, AMPHETAMINE ASPARTATE, DEXTROAMPHETAMINE SULFATE AND AMPHETAMINE SULFATE 5; 5; 5; 5 MG/1; MG/1; MG/1; MG/1
TABLET ORAL
Qty: 45 TABLET | Refills: 0 | Status: SHIPPED | OUTPATIENT
Start: 2025-08-05